# Patient Record
Sex: FEMALE | Race: WHITE | NOT HISPANIC OR LATINO | Employment: FULL TIME | URBAN - METROPOLITAN AREA
[De-identification: names, ages, dates, MRNs, and addresses within clinical notes are randomized per-mention and may not be internally consistent; named-entity substitution may affect disease eponyms.]

---
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
## 2021-03-16 ENCOUNTER — HOSPITAL ENCOUNTER (OUTPATIENT)
Dept: FAMILY MEDICINE CLINIC | Facility: CLINIC | Age: 21
Discharge: HOME | End: 2021-03-16
Attending: FAMILY MEDICINE

## 2021-03-16 ENCOUNTER — CONVERSION ENCOUNTER (OUTPATIENT)
Dept: FAMILY MEDICINE CLINIC | Facility: CLINIC | Age: 21
End: 2021-03-16

## 2021-03-16 ENCOUNTER — OFFICE VISIT CONVERTED (OUTPATIENT)
Dept: FAMILY MEDICINE CLINIC | Facility: CLINIC | Age: 21
End: 2021-03-16
Attending: FAMILY MEDICINE

## 2021-03-16 LAB
ALBUMIN SERPL-MCNC: 4.4 G/DL (ref 3.5–5)
ALBUMIN/GLOB SERPL: 1.8 {RATIO} (ref 1.4–2.6)
ALP SERPL-CCNC: 66 U/L (ref 50–130)
ALT SERPL-CCNC: 16 U/L (ref 10–40)
ANION GAP SERPL CALC-SCNC: 15 MMOL/L (ref 8–19)
AST SERPL-CCNC: 21 U/L (ref 15–50)
BILIRUB SERPL-MCNC: 0.81 MG/DL (ref 0.2–1.3)
BILIRUB UR QL STRIP: NEGATIVE
BUN SERPL-MCNC: 10 MG/DL (ref 5–25)
BUN/CREAT SERPL: 13 {RATIO} (ref 6–20)
CALCIUM SERPL-MCNC: 9.4 MG/DL (ref 8.7–10.4)
CHLORIDE SERPL-SCNC: 104 MMOL/L (ref 99–111)
COLOR UR: YELLOW
CONV CLARITY OF URINE: NORMAL
CONV CO2: 23 MMOL/L (ref 22–32)
CONV PROTEIN IN URINE BY AUTOMATED TEST STRIP: NEGATIVE
CONV TOTAL PROTEIN: 6.9 G/DL (ref 6.3–8.2)
CONV UROBILINOGEN IN URINE BY AUTOMATED TEST STRIP: NORMAL
CREAT UR-MCNC: 0.75 MG/DL (ref 0.5–0.9)
GFR SERPLBLD BASED ON 1.73 SQ M-ARVRAT: >60 ML/MIN/{1.73_M2}
GLOBULIN UR ELPH-MCNC: 2.5 G/DL (ref 2–3.5)
GLUCOSE SERPL-MCNC: 79 MG/DL (ref 65–99)
GLUCOSE UR QL: NEGATIVE
HGB UR QL STRIP: NORMAL
KETONES UR QL STRIP: NEGATIVE
LEUKOCYTE ESTERASE UR QL STRIP: NEGATIVE
NITRITE UR QL STRIP: NEGATIVE
OSMOLALITY SERPL CALC.SUM OF ELEC: 284 MOSM/KG (ref 273–304)
PH UR STRIP.AUTO: 6.5 [PH]
POTASSIUM SERPL-SCNC: 4.1 MMOL/L (ref 3.5–5.3)
SODIUM SERPL-SCNC: 138 MMOL/L (ref 135–147)
SP GR UR: NORMAL

## 2021-03-18 LAB — BACTERIA UR CULT: NORMAL

## 2021-03-23 ENCOUNTER — HOSPITAL ENCOUNTER (OUTPATIENT)
Dept: CT IMAGING | Facility: HOSPITAL | Age: 21
Discharge: HOME | End: 2021-03-23
Attending: FAMILY MEDICINE

## 2021-03-23 ENCOUNTER — HOSPITAL ENCOUNTER (OUTPATIENT)
Dept: FAMILY MEDICINE CLINIC | Facility: CLINIC | Age: 21
Discharge: HOME | End: 2021-03-23
Attending: FAMILY MEDICINE

## 2021-03-25 LAB — BACTERIA UR CULT: NORMAL

## 2021-03-26 ENCOUNTER — HOSPITAL ENCOUNTER (OUTPATIENT)
Dept: URGENT CARE | Facility: CLINIC | Age: 21
Discharge: HOME | End: 2021-03-26
Attending: NURSE PRACTITIONER

## 2021-03-26 LAB
CONV HIV-1/ HIV-2: NONREACTIVE
RPR SER QL: NORMAL

## 2021-03-29 LAB
C TRACH RRNA CVX QL NAA+PROBE: NEGATIVE
CONV HEPATITIS B SURFACE AG W CONFIRMATION RE: NEGATIVE
HAV IGM SERPL QL IA: NEGATIVE
HBV CORE IGM SERPL QL IA: NEGATIVE
HCV AB SER DONR QL: <0.1 S/CO RATIO (ref 0–0.9)
N GONORRHOEA DNA SPEC QL NAA+PROBE: NEGATIVE

## 2021-04-07 ENCOUNTER — CONVERSION ENCOUNTER (OUTPATIENT)
Dept: OTOLARYNGOLOGY | Facility: CLINIC | Age: 21
End: 2021-04-07

## 2021-04-07 ENCOUNTER — OFFICE VISIT CONVERTED (OUTPATIENT)
Dept: OTOLARYNGOLOGY | Facility: CLINIC | Age: 21
End: 2021-04-07
Attending: OTOLARYNGOLOGY

## 2021-04-16 ENCOUNTER — HOSPITAL ENCOUNTER (OUTPATIENT)
Dept: ULTRASOUND IMAGING | Facility: HOSPITAL | Age: 21
Discharge: HOME | End: 2021-04-16
Attending: OTOLARYNGOLOGY

## 2021-04-22 ENCOUNTER — TELEPHONE CONVERTED (OUTPATIENT)
Dept: OTOLARYNGOLOGY | Facility: CLINIC | Age: 21
End: 2021-04-22
Attending: OTOLARYNGOLOGY

## 2021-05-13 NOTE — PROGRESS NOTES
Progress Note      Patient Name: Fátima Cortes   Patient ID: [id number]   Sex: Female   YOB: 2000    Primary Care Provider: Daily Marley MD   Referring Provider: Daily Marley MD    Visit Date: 2020    Provider: LEILA Jovel   Location: [facility]   Location Address: [address] [place] [address], [address]  [place], [place]  [id number]   Location Phone: [phone]          Chief Complaint  · BODYACHES, FEVER, CHILLS      History Of Present Illness  Fátima Cortes is a 20 year old /White female who presents for evaluation and treatment of:      Presents today for an acute visit for body aches chills fever nausea and vomiting.  She states Monday she began feeling nauseous and started vomiting Tuesday she felt better but Wednesday she says she began having with fever chills and body aches.  She works at [employer].  She denies being around anybody ill.       Past Medical History  Back pain; ETD (Eustachian tube dysfunction), bilateral; Lymphadenopathy of head and neck; Recurrent tonsillitis; Viral pharyngitis         Past Surgical History  Ear Tubes; Thyroid surgery         Medication List  levothyroxine 125 mcg oral tablet; promethazine 12.5 mg oral tablet         Allergy List  NO KNOWN DRUG ALLERGIES         Family Medical History  Lung Neoplasm, Malignant; Thyroid Gland Neoplasm, Malignant         Reproductive History   0 Para 0 0 0 0       Social History  Tobacco (Never)         Immunizations  Name Date Admin   Meningococcal (MNG)    Meningococcal (MNG)    Meningococcal (MNG)          Review of Systems  · Constitutional  o Admits  o : fatigue, fever, chills, body aches  o Denies  o : headache  · Eyes  o Denies  o : blurred vision, changes in vision  · HENT  o Admits  o : lightheadedness, nasal congestion, nasal discharge  o Denies  o : headaches, vertigo, sinus pain, postnasal drip, sore throat, ear pain, ear  "fullness  · Cardiovascular  o Denies  o : lower extremity edema, claudication, chest pressure, palpitations  · Respiratory  o Denies  o : shortness of breath, wheezing, cough  · Gastrointestinal  o Admits  o : nausea, vomiting  o Denies  o : diarrhea, constipation, abdominal pain, blood in stools, melena  · Genitourinary  o Denies  o : frequency, dysuria, hematuria      Vitals  Date Time BP Position Site L\R Cuff Size HR RR TEMP (F) WT  HT  BMI kg/m2 BSA m2 O2 Sat        09/09/2020 09:41 /76 Sitting    94 - R  97.5 106lbs 0oz 5'  2\" 19.39 1.45 99 %          Physical Examination  · Constitutional  o Appearance  o : alert, in no acute distress  · Head and Face  o Head  o :   § Inspection  § : atraumatic, normocephalic  o Face  o :   § Inspection  § : no facial lesions  o HEENT  o : Unremarkable  · Eyes  o Conjunctivae  o : conjunctivae normal  o Sclerae  o : sclerae white  o Pupils and Irises  o : pupils equal and round, pupils reactive to light bilaterally  o Eyelids/Ocular Adnexae  o : eyelid appearance normal  · Ears, Nose, Mouth and Throat  o Ears  o :   § External Ears  § : appearance within normal limits, no lesions present  § Otoscopic Examination  § : tympanic membrane appearance within normal limits bilaterally  o Nose  o :   § External Nose  § : appearance normal  o Oral Cavity  o :   § Oral Mucosa  § : oral mucosa normal  § Lips  § : lip appearance normal  § Teeth  § : normal dentition for age  § Gums  § : gums pink, non-swollen, no bleeding present  § Tongue  § : tongue appearance normal  § Palate  § : hard palate normal, soft palate appearance normal  o Throat  o : no erythema, tonsils +1, no exudate  · Neck  o Inspection/Palpation  o : normal appearance, no masses or tenderness, trachea midline  o Thyroid  o : gland size normal, nontender, no nodules or masses present on palpation  · Respiratory  o Respiratory Effort  o : breathing unlabored  o Auscultation of Lungs  o : normal breath " sounds  · Cardiovascular  o Heart  o :   § Auscultation of Heart  § : regular rate, normal rhythm, no murmurs present  o Peripheral Vascular System  o :   § Extremities  § : no edema  · Gastrointestinal  o Abdominal Examination  o : abdomen nontender to palpation, normal bowel sounds, tone normal without rigidity or guarding, no masses present  o Liver and spleen  o : no hepatomegaly present  · Lymphatic  o Neck  o : no lymphadenopathy   o Supraclavicular Nodes  o : no supraclavicular nodes          Results  · In-Office Procedures  o Lab procedure  § IOP - Influenza A/B Test (12766)   § Influenza A: Negative   § Influenza B: Negative   § Internal Control Verified?: Yes       Assessment  · Nausea and vomiting     787.01/R11.2  Meclizine 12.5 mg every 6 hours as needed for nausea and vomiting  · Flu-like symptoms     780.99/R68.89  Flu was negative. Will test for COVID-19. Acetaminophen 650 mg was given to patient in office. Will prescribe Tamiflu 75 mg twice daily    Problems Reconciled  Plan  · Orders  o ACO-39: Current medications updated and reviewed () - - 09/09/2020  o [facility] NCOV2 (send-out) (63140) - 787.01/R11.2, 780.99/R68.89 - 09/09/2020  · Medications  o Tamiflu 75 mg oral capsule   SIG: take 1 capsule (75 mg) by oral route 2 times per day for 5 days   DISP: (10) capsules with 0 refills  Prescribed on 09/09/2020     o promethazine 12.5 mg oral tablet   SIG: take 1 tablet (12.5 mg) by oral route every 6 hours as needed for nausea and vomiting   DISP: (10) tablets with 0 refills  Refilled on 09/09/2020     o Augmentin 875-125 mg oral tablet   SIG: take 1 tablet by oral route every 12 hours for 7 days   DISP: (14) tablet with 0 refills  Discontinued on 09/09/2020     o Medications have been Reconciled  o Transition of Care or Provider Policy  · Instructions  o Patient was educated/instructed on their diagnosis, treatment and medications prior to discharge from the clinic today.  o Patient  instructed to seek medical attention urgently for new or worsening symptoms.  o Call the office with any concerns or questions.  · Disposition  o Call or Return if symptoms worsen or persist.            Electronically Signed by: Arnie Jaime APRN -Author on September 9, 2020 10:57:34 AM

## 2021-05-13 NOTE — PROGRESS NOTES
Progress Note      Patient Name: Fátima Cortes   Patient ID: [id number]   Sex: Female   YOB: 2000    Primary Care Provider: Daily Marley MD   Referring Provider: Daily Marley MD    Visit Date: September 17, 2020    Provider: Daily Marley MD   [place] [facility]   [place] Address: [address] [place] [address], [address] [place] [address]  [id number]   Location Phone: [phone]          Chief Complaint  · Follow up office visit within 7 calendar days of discharge from inpatient status (high complexity).      History Of Present Illness  Fátima Cortes is a 20 year old /White female who presents for evaluation and treatment of:   FOLLOW UP OFFICE VISIT WITHIN 7 CALENDAR DAYS OF INPATIENT STATUS (SEVERE COMPLEXITY)  Fátima Cortes presents to office for follow up post discharge from inpatient status within 7 calendar days. Patient was contacted within 2 business days via phone conversation. Documentation of that phone contact is present in the patient's electronic chart. Patient was admitted to an inpatient faciliity on 09/10/2020 and discharged on 09/12/2020 due to: Pyelonephritis   Admitting MD: Jessica   Her discharge summary has been reviewed and placed in the patient's electronic chart.   Patient's problem list is: ETD (Eustachian tube dysfunction), bilateral, Lymphadenopathy of head and neck, and Recurrent tonsillitis   Patient's outpatient medication list has been reconciled with the medication list from the discharge summary and has been reviewed with the patient. Current medication list is: ibuprofen 200 mg oral tablet, Levaquin 750 mg oral tablet, levothyroxine 125 mcg oral tablet, Tylenol Extra Strength 500 mg oral tablet, and Vienva 0.1-20 mg-mcg oral tablet      Patient reports that she does feel much better than when she recently had the infection.  Patient is sexually active so we discussed that she needs to go to the restroom and urinate after intercourse  also increase her fluids so that she can help flush her kidneys.  Patient has moderate antibiotics left.  Patient also needs to be taking prenatal vitamins even though she is on birth control.  Patient reports that she is currently not in school but is think about retaking classes in the spring.    Scoliosispatient reports that she has had chronic back pain for the last 2 years.  She has not had a scoliosis survey done of her back in approximately 2 to 3 years.       Past Medical History  Back pain; ETD (Eustachian tube dysfunction), bilateral; Lymphadenopathy of head and neck; Recurrent tonsillitis; Viral pharyngitis         Past Surgical History  Ear Tubes; Thyroid surgery         Medication List  ibuprofen 200 mg oral tablet; Levaquin 750 mg oral tablet; levothyroxine 125 mcg oral tablet; Tylenol Extra Strength 500 mg oral tablet; Vienva 0.1-20 mg-mcg oral tablet         Allergy List  NO KNOWN DRUG ALLERGIES         Family Medical History  Lung Neoplasm, Malignant; Thyroid Gland Neoplasm, Malignant         Reproductive History   0 Para 0 0 0 0       Social History  Tobacco (Never)         Immunizations  Name Date Admin   Meningococcal (MN) 2019   Meningococcal (MN) 2019   Meningococcal (MNG) 2019         Review of Systems  · Constitutional  o Denies  o : fatigue, fever, weight loss, weight gain  · Eyes  o Denies  o : eye discomfort, eye pain  · HENT  o Denies  o : vertigo, nasal congestion  · Cardiovascular  o Denies  o : chest pain, irregular heart beats  · Respiratory  o Denies  o : shortness of breath, wheezing  · Gastrointestinal  o Denies  o : nausea, vomiting  · Genitourinary  o Denies  o : frequency, dysuria  · Integument  o Denies  o : rash, itching  · Neurologic  o Denies  o : muscular weakness, memory difficulties  · Musculoskeletal  o Admits  o : back pain  o Denies  o : joint swelling, muscle pain  · Psychiatric  o Denies  o : anxiety, depression  · Heme-Lymph  o Denies  o :  "lightheadedness, easy bleeding  · Allergic-Immunologic  o Denies  o : sinus allergy symptoms, allergic dermatitis      Vitals  Date Time BP Position Site L\R Cuff Size HR RR TEMP (F) WT  HT  BMI kg/m2 BSA m2 O2 Sat FR L/min FiO2        09/17/2020 08:11 /78 Sitting    77 - R 14 97.6 109lbs 6oz 5'  2\" 20 1.47 99 %            Physical Examination  · Constitutional  o Appearance  o : well-nourished, in no acute distress  · Eyes  o Conjunctivae  o : conjunctivae normal  o Sclerae  o : sclerae white  o Pupils and Irises  o : pupils equal, round, and reactive to light and accommodation bilaterally  o Eyelids/Ocular Adnexae  o : eyelid appearance normal, no exudates present  · Ears, Nose, Mouth and Throat  o Ears  o :   § External Ears  § : external auditory canal appearance within normal limits, no discharge present  § Otoscopic Examination  § : tympanic membrane appearance within normal limits bilaterally  o Nose  o :   § External Nose  § : appearance normal  § Nasopharynx  § : no discharge present  o Oral Cavity  o :   § Oral Mucosa  § : oral mucosa normal  § Lips  § : lip appearance normal  o Throat  o :   § Oropharynx  § : no inflammation or lesions present, tonsils within normal limits  · Neck  o Inspection/Palpation  o : normal appearance, no masses or tenderness, trachea midline  o Range of Motion  o : cervical range of motion within normal limits  o Thyroid  o : gland size normal, nontender, no nodules or masses present on palpation  o Jugular Veins  o : JVP normal  · Respiratory  o Respiratory Effort  o : breathing unlabored  o Inspection of Chest  o : normal appearance  o Auscultation of Lungs  o : normal breath sounds throughout  · Cardiovascular  o Heart  o :   § Auscultation of Heart  § : regular rate and rhythm, no murmurs, gallops or rubs  o Peripheral Vascular System  o :   § Extremities  § : no edema  · Gastrointestinal  o Abdominal Examination  o : abdomen nontender to palpation, tone normal " without rigidity or guarding, no masses present, bowel sounds present in all four quadrants  o Liver and spleen  o : no hepatomegaly present, liver nontender to palpation, spleen not palpable  o Hernias  o : no hernias present  · Lymphatic  o Neck  o : no lymphadenopathy present  · Musculoskeletal  o Spine  o :   § Inspection/Palpation  § : paraspinal muscle tenderness present   § Stability  § : no subluxations present  § Range of Motion  § : spine range of motion normal  o Right Upper Extremity  o :   § Inspection/Palpation  § : no tenderness to palpation, ROM normal  o Left Upper Extremity  o :   § Inspection/Palpation  § : no tenderness to palpation, ROM normal  o Right Lower Extremity  o :   § Inspection/Palpation  § : no joint or limb tenderness to palpation, ROM normal  o Left Lower Extremity  o :   § Inspection/Palpation  § : no joint or limb tenderness to palpation, ROM normal  · Skin and Subcutaneous Tissue  o General Inspection  o : no rashes or lesions present, no areas of discoloration  o Body Hair  o : scalp palpation normal, hair normal for age, general body hair distribution normal for age  o Digits and Nails  o : no clubbing, cyanosis, deformities or edema present, normal appearing nails  · Neurologic  o Mental Status Examination  o :   § Orientation  § : grossly oriented to person, place and time  o Gait and Station  o : normal gait, able to stand without difficulty  · Psychiatric  o Judgement and Insight  o : judgment and insight intact  o Mood and Affect  o : mood normal, affect appropriate              Assessment  · Lumbago     724.2/M54.5  · Screening for depression     V79.0/Z13.89  · Scoliosis     737.30/M41.9  · Pyelonephritis     590.80/N12      Plan  · Orders  o MRI lumbar spine w/o contrast (39749) - 724.2/M54.5 - 09/17/2020  o ACO-39: Current medications updated and reviewed () - - 09/17/2020  o ACO-18: Negative screen for clinical depression using a standardized tool () - -  09/17/2020  o ACO-14: Influenza immunization was not administered for reasons documented () - - 09/17/2020   decliend  o Discharge medications reconciled with the current medication list (1111F) - - 09/17/2020  o Scoliosis Survey (76157) - 737.30/M41.9 - 09/17/2020  · Medications  o Medications have been Reconciled  o Transition of Care or Provider Policy  · Instructions  o Depression Screen completed and scanned into the EMR under the designated folder within the patient's documents.  o Patient was educated/instructed on their diagnosis, treatment and medications prior to discharge from the clinic today.  o Minutes spent with patient including greater than 50% in Education/Counseling/Care Coordination.  o Time spent with the patient was minutes, more than 50% face to face. 25 minutes  o Patient discharge summary has been reviewed and placed in patient's electronic medical record.  o Patient received a phone call from my office within 2 business days of discharge from inpatient status, and documented within the patient's chart.  o Also patient was seen (face to face) for follow up evaluation within 7 calendar days of discharge from inpatient status for a high complexity issue.  o Patient was educated on their diagnosis, treatment and any medication changes while being evaluated today.  · Disposition  o f/u 2 months            Electronically Signed by: Daily Marley MD -Author on October 26, 2020 06:52:04 AM

## 2021-05-14 VITALS
SYSTOLIC BLOOD PRESSURE: 104 MMHG | BODY MASS INDEX: 19.92 KG/M2 | HEIGHT: 62 IN | DIASTOLIC BLOOD PRESSURE: 66 MMHG | HEART RATE: 70 BPM | TEMPERATURE: 98 F | WEIGHT: 108.25 LBS | OXYGEN SATURATION: 100 %

## 2021-05-14 VITALS
TEMPERATURE: 98.2 F | HEIGHT: 62 IN | HEART RATE: 56 BPM | WEIGHT: 114.37 LBS | DIASTOLIC BLOOD PRESSURE: 52 MMHG | BODY MASS INDEX: 21.05 KG/M2 | OXYGEN SATURATION: 98 % | SYSTOLIC BLOOD PRESSURE: 100 MMHG

## 2021-05-14 VITALS — WEIGHT: 116.25 LBS | TEMPERATURE: 97.4 F | BODY MASS INDEX: 21.39 KG/M2 | HEIGHT: 62 IN

## 2021-05-14 VITALS
HEART RATE: 94 BPM | TEMPERATURE: 97.5 F | WEIGHT: 106 LBS | DIASTOLIC BLOOD PRESSURE: 76 MMHG | BODY MASS INDEX: 19.51 KG/M2 | SYSTOLIC BLOOD PRESSURE: 112 MMHG | HEIGHT: 62 IN | OXYGEN SATURATION: 99 %

## 2021-05-14 VITALS
RESPIRATION RATE: 14 BRPM | HEART RATE: 77 BPM | SYSTOLIC BLOOD PRESSURE: 110 MMHG | DIASTOLIC BLOOD PRESSURE: 78 MMHG | TEMPERATURE: 97.6 F | OXYGEN SATURATION: 99 % | HEIGHT: 62 IN | WEIGHT: 109.37 LBS | BODY MASS INDEX: 20.13 KG/M2

## 2021-05-14 NOTE — PROGRESS NOTES
"   Progress Note      Patient Name: Fátima Cortes   Patient ID: [id number]   Sex: Female   YOB: 2000    Primary Care Provider: Daily Marley MD   Referring Provider: Daily Marley MD    Visit Date: December 29, 2020    Provider: LEILA Merino   Location: [facility]   Location Address: [address] [place] [address], [address] [place] [address]  [id number]   Location Phone: [phone]          Chief Complaint  · possible UTI  · right flank pain      History Of Present Illness  Fátima Cortes is a 20 year old /White female who presents for evaluation and treatment of:      She is here for an acute visit today, she is a patient of Dr. Sutton.    She is complaining of possible bladder infection.  She states in September she was in the hospital due to pyelonephritis.  She is complaining of right flank pain again with some bladder pressure for the past 2 days.  She denies any fever chills or nausea and vomiting. She was tested for STDs while in the hospital, always negative.    UA trace blood, negative WBCs, negative nitrites.  Urine pregnancy test is negative.       Past Medical History  Disease Name Date Onset Notes   Back pain --  --    ETD (Eustachian tube dysfunction), bilateral --  --    Lymphadenopathy of head and neck --  --    Recurrent tonsillitis --  --    Viral pharyngitis --  --          Past Surgical History  Procedure Name Date Notes   Ear Tubes 2002 --    Thyroid surgery 2017 \"Done in [place]\"         Medication List  Name Date Started Instructions   ibuprofen 200 mg oral tablet  take 2 tablets (400 mg) by oral route every 4 hours as needed with food   levothyroxine 125 mcg oral tablet  take 1 tablet (125 mcg) by oral route once daily   Tylenol Extra Strength 500 mg oral tablet  take 2 tablets (1,000 mg) by oral route every 4 hours as needed not to exceed 8 tablets per 24hrs   Vienva 0.1-20 mg-mcg oral tablet  take 1 tablet by oral route once daily " "        Allergy List  Allergen Name Date Reaction Notes   NO KNOWN DRUG ALLERGIES --  --  --          Family Medical History  Disease Name Relative/Age Notes   Lung Neoplasm, Malignant  Paternal grandparent   Thyroid Gland Neoplasm, Malignant Aunt/   --          Reproductive History  Menstrual   Last Menstrual Period: 10/05/2015   Pregnancy Summary   Total Pregnancies: 0 Full Term: 0 Premature: 0   Ab Induced: 0 Ab Spontaneous: 0 Ectopics: 0   Multiples: 0 Livin         Social History  Finding Status Start/Stop Quantity Notes   Tobacco Never --/-- --  --          Immunizations  NameDate Admin Mfg Trade Name Lot Number Route Inj VIS Given VIS Publication   InfluenzaRefused 2020 NE Not Entered  NE NE     Comments:    Meningococcal (MNG)2019 SKB MENACTRA [id number] IM LD 2019    Comments: Tolerated well, stable         Review of Systems  · Constitutional  o Denies  o : fatigue, fever, chills, body aches, night sweats  · Cardiovascular  o Denies  o : lower extremity edema, claudication, chest pressure, palpitations  · Respiratory  o Denies  o : shortness of breath, wheezing, cough, hemoptysis, dyspnea on exertion  · Gastrointestinal  o Denies  o : nausea, vomiting, diarrhea, constipation, abdominal pain  · Genitourinary  o Denies  o : urgency, frequency, dysuria  · Integument  o Denies  o : rash, itching  · Musculoskeletal  o Admits  o : back pain  o Denies  o : limitation of motion      Vitals  Date Time BP Position Site L\R Cuff Size HR RR TEMP (F) WT  HT  BMI kg/m2 BSA m2 O2 Sat FR L/min FiO2        2020 10:48 /66 Sitting    70 - R  98 108lbs 4oz 5'  2\" 19.8 1.47 100 %            Physical Examination  · Constitutional  o Appearance  o : no acute distress, well-nourished  · Head and Face  o Head  o :   § Inspection  § : atraumatic, normocephalic  · Respiratory  o Respiratory Effort  o : breathing comfortably, symmetric chest rise  o Auscultation of Lungs  o : clear to asculatation " bilaterally, no wheezes, rales, or rhonchii  · Cardiovascular  o Heart  o :   § Auscultation of Heart  § : regular rate and rhythm, no murmurs, rubs, or gallops  o Peripheral Vascular System  o :   § Extremities  § : no edema  · Musculoskeletal  o Spine  o :   § Inspection/Palpation  § : no spinal tenderness or misalignment, no CVA tenderness noted  § Muscle Strength/Tone  § : right paraspinal muscle spasm present   · Neurologic  o Mental Status Examination  o :   § Orientation  § : grossly oriented to person, place and time  o Gait and Station  o :   § Gait Screening  § : normal gait  · Psychiatric  o General  o : normal mood and affect          Results  · In-Office Procedures  o Lab procedure  § IOP - Urinalysis without Microscopy (Clinitek) [facility] (88488)   § Color Ur: Yellow   § Clarity Ur: Clear   § Glucose Ur Ql Strip: Negative   § Bilirub Ur Ql Strip: Negative   § Ketones Ur Ql Strip: Negative   § Sp Gr Ur Qn: 1.025   § Hgb Ur Ql Strip: Trace-Intact   § pH Ur-LsCnc: 5.5   § Prot Ur Ql Strip: Negative   § Urobilinogen Ur Strip-mCnc: 0.2 E.U./dL   § Nitrite Ur Ql Strip: Negative   § WBC Est Ur Ql Strip: Negative   § IOP - Urine Pregnancy Test (44373)   § B-HCG Ur Ql: Negative   § Internal Control Verified?: Yes       Assessment  · Pelvic pressure in female     625.8/R10.2  · Hematuria     599.70/R31.9  · History of pyelonephritis     V13.02/Z87.448  · Right flank pain     789.09/R10.9  Due to her history of pyelonephritis, will get a KUB, will send urine for culture and sensitivity. I will go ahead and start her on Cipro 500 mg twice daily. Increase fluid intake. If you develop fever, chills, N/V, flank pain, or worsening of your symptoms return to the office or go to the ER.      Plan  · Orders  o Urinalysis with Reflex Microscopy ([facility]) (42454) - 625.8/R10.2, 599.70/R31.9, V13.02/Z87.448, 789.09/R10.9 - 12/29/2020  o Urine culture (09909, 46873) - 625.8/R10.2, 599.70/R31.9, V13.02/Z87.448, 789.09/R10.9 -  12/29/2020  o ACO-39: Current medications updated and reviewed (, 1159F) - - 12/29/2020  o ACO-14: Influenza immunization was not administered for reasons documented [facility] () - - 12/29/2020   patient declines  o KUB xray [facility] (30114) - 625.8/R10.2, 599.70/R31.9, V13.02/Z87.448, 789.09/R10.9 - 12/29/2020  · Medications  o Cipro 500 mg oral tablet   SIG: take 1 tablet (500 mg) by oral route every 12 hours for 7 days   DISP: (14) Tablet with 0 refills  Prescribed on 12/29/2020     o Medications have been Reconciled  o Transition of Care or Provider Policy  · Instructions  o Rest. Increase Fluids.  o Patient was educated/instructed on their diagnosis, treatment and medications prior to discharge from the clinic today.  o Patient instructed to seek medical attention urgently for new or worsening symptoms.  o Call the office with any concerns or questions.  o Increase fluid intake. If you develop fever, chills, N/V, flank pain, or worsening of your symptoms return to the office or go to the ER.  · Disposition  o Call or Return if symptoms worsen or persist.            Electronically Signed by: LEILA Merino -Author on December 29, 2020 11:22:09 AM

## 2021-05-14 NOTE — PROGRESS NOTES
"   Progress Note      Patient Name: Fátima Cortes   Patient ID: [id number]   Sex: Female   YOB: 2000    Primary Care Provider: Daily Marley MD   Referring Provider: Daily Marley MD    Visit Date: April 7, 2021    Provider: Dhaval Diallo MD   Location: [facility] [place] [place] [facility]   Location Address: [address] [place] [address], [address] [place] [address]  [id number]   Location Phone: [phone]          Chief Complaint     \"I noticed a lump a few weeks ago.\"       History Of Present Illness     Fátima Cortes is a 21 year old /White female with past medical history significant for total thyroidectomy secondary to thyroid nodules who returns today for evaluation of the neck mass. She was originally seen on 5/8/18 at which time she had initially noticed a left-sided neck mass at the beginning of February. She had suffered from strep throat a month before but was not sick at the time she discovered the mass. She ended up taking a 10 day course of amoxicillin starting on 3/27/18 without improvement. They had decreased in size and were never painful. She has undergone extensive workup between [facility] and [facility] including negative CMV DNA, negative Bartonella henselae and quinana on 4/17/18. She also was noted to have increased EBV IgG but not IgM. She also underwent an ultrasound that [facility] on 4/13/18 which revealed a left 2.1 x 0.9 x 2 cm lymph node. She was seen by Dr. Rivera where she underwent a hematologic evaluation for leukemia and lymphoma which was negative. On examination that day there were bilateral level II lymph nodes which were approximately 1 cm diameter rubbery to palpation. Options for management were discussed and she decided to continue observation and a repeat ultrasound was scheduled for 6 weeks. Repeat ultrasound of the neck on 8/8/18 revealed 2 abnormal-appearing hypo-echoic nodes in the left neck measuring 1.6 x 1.2 x 0.6 cm and 1.1 x 1.1 x 0.4 cm. ultrasound-guided " FNA this was performed on 18 and was negative for malignant cells.  There was noted to be a polymorphous lymphoid population most consistent with benign reactive lymph node.      She returns today for evaluation of the neck mass.  She tells me that she noticed it a few weeks ago.  It has not changed in size and is not causing her any pain.  She denies any sore throat, dysphagia, otalgia, hoarseness, neck stiffness, fever, chills, night sweats, or unintentional weight loss.  CT scan of the neck with contrast on 3/23/2021 revealed 2 separate enhancing masses in the area of the previous thyroid on the left with the largest measuring up to 1.6 cm.  She does have a previous history of total thyroidectomy she believes at [facility].  She tells me that they did not find any cancer in her thyroid but there is a family history of thyroid cancer.  He has not undergone any further imaging or tissue sampling.  She previously underwent an ultrasound of the head and neck on 2018 but I cannot review those images due to a system error.  At that time there were 2 hypoechoic masses in the left neck measuring 1.6 and 1.1 cm.              Past Medical History  Back pain; ETD (Eustachian tube dysfunction), bilateral; Lymphadenopathy of head and neck; Recurrent tonsillitis; Viral pharyngitis         Past Surgical History  Ear Tubes; Thyroid surgery         Medication List  ibuprofen 200 mg oral tablet; levothyroxine 125 mcg oral tablet         Allergy List  NO KNOWN DRUG ALLERGIES         Family Medical History  Lung Neoplasm, Malignant; Thyroid Gland Neoplasm, Malignant         Reproductive History   0 Para 0 0 0 0       Social History  Tobacco (Never)         Immunizations  Name Date Admin   Influenza Refused   Meningococcal (MNG) 2019   Meningococcal (MNG) 2019   Meningococcal (MNG) 2019         Review of Systems  · Constitutional  o Denies  o : fever, night sweats, weight  "loss  · Eyes  o Denies  o : discharge from eye, impaired vision  · HENT  o Admits  o : *See HPI  · Cardiovascular  o Denies  o : chest pain, irregular heart beats  · Respiratory  o Denies  o : shortness of breath, wheezing, coughing up blood  · Gastrointestinal  o Denies  o : heartburn, reflux, vomiting blood  · Genitourinary  o Denies  o : frequency  · Integument  o Denies  o : rash, skin dryness  · Neurologic  o Denies  o : seizures, loss of balance, loss of consciousness, dizziness  · Endocrine  o Denies  o : cold intolerance, heat intolerance  · Heme-Lymph  o Denies  o : easy bleeding, anemia      Vitals  Date Time BP Position Site L\R Cuff Size HR RR TEMP (F) WT  HT  BMI kg/m2 BSA m2 O2 Sat FR L/min FiO2 HC       04/07/2021 09:02 AM        97.4 116lbs 4oz 5'  2\" 21.26 1.52             Physical Examination  · Constitutional  o Appearance  o : well developed, well-nourished, alert and in no acute distress, voice clear and strong  · Head and Face  o Head  o :   § Inspection  § : no deformities or lesions  o Face  o :   § Inspection  § : No facial lesions; House-Brackmann I/VI bilaterally  § Palpation  § : No TMJ crepitus nor  muscle tenderness bilaterally  · Eyes  o Vision  o :   § Visual Fields  § : Extraocular movements are intact. No spontaneous or gaze-induced nystagmus.  o Conjunctivae  o : clear  o Sclerae  o : clear  o Pupils and Irises  o : pupils equal, round, and reactive to light.   · Ears, Nose, Mouth and Throat  o Ears  o :   § External Ears  § : appearance within normal limits, no lesions present  § Otoscopic Examination  § : tympanic membrane appearance within normal limits bilaterally without perforations, well-aerated middle ears  § Hearing  § : intact to conversational voice both ears  o Nose  o :   § External Nose  § : appearance normal  § Intranasal Exam  § : mucosa within normal limits, vestibules normal, no intranasal lesions present, septum midline, sinuses non tender to " percussion  o Oral Cavity  o :   § Oral Mucosa  § : oral mucosa normal without pallor or cyanosis  § Lips  § : lip appearance normal  § Teeth  § : normal dentition for age  § Gums  § : gums pink, non-swollen, no bleeding present  § Tongue  § : tongue appearance normal; normal mobility  § Palate  § : hard palate normal, soft palate appearance normal with symmetric mobility  o Throat  o :   § Oropharynx  § : no inflammation or lesions present, tonsils within normal limits  § Hypopharynx  § : Deferred secondary to gag reflex  § Larynx  § : Deferred secondary to gag reflex  · Neck  o Inspection/Palpation  o : normal appearance, no masses or tenderness, trachea midline; thyroid surgically absent with a faint scar.  · Respiratory  o Respiratory Effort  o : breathing unlabored  o Inspection of Chest  o : normal appearance, no retractions  · Cardiovascular  o Heart  o : regular rate and rhythm  · Lymphatic  o Neck  o : Approximately 1.5 cm soft nontender mass overlying the left thyroid cartilage which is mobile  o Supraclavicular Nodes  o : no lymphadenopathy present  o Preauricular Nodes  o : no lymphadenopathy present  · Skin and Subcutaneous Tissue  o General Inspection  o : Regarding face and neck - there are no rashes present, no lesions present, and no areas of discoloration  · Neurologic  o Cranial Nerves  o : cranial nerves II-XII are grossly intact bilaterally  o Gait and Station  o : normal gait, able to stand without diffculty  · Psychiatric  o Judgement and Insight  o : judgment and insight intact  o Mood and Affect  o : mood normal, affect appropriate          Assessment  · Neck mass     784.2/R22.1      Plan  · Orders  o FNA Neck Mass w/ guidance (66247) - 784.2/R22.1 - 04/07/2021  · Medications  o Medications have been Reconciled  o Transition of Care or Provider Policy  · Instructions  o Impressions and findings were discussed with Ms. Cortes at great length. Currently, she is seen for evaluation of a  left-sided neck mass which she initially noticed around 3 or 4 weeks ago. She does have a previous history of total thyroidectomy for noncancerous disease. She was actually previously seen for left-sided lymphadenopathy which an FNA on 9/12/2018 noted a polymorphous lymphoid population most likely consistent with benign reactive lymph node. She has undergone a previous ultrasound of that area but those images are not available to me today. However, her most recent CT scan of the neck with contrast from 3/23/2021 revealed 2 separate enhancing masses centered on the thyroid cartilage on the left with one measuring up to 1.6 cm and the other up to 1.4 cm. We discussed that these appear consistent with residual thyroid tissue and I am curious if this may represent the previously noted neck masses although the palpable nodule today seems to be in a different place than previously. Options for further evaluation and management were discussed including continued observation versus ultrasound-guided FNA. After a thorough discussion she has elected to pursue an ultrasound-guided FNA. We will discuss the results over the phone.  · Correspondence  o ENT Letter to Referring MD (Daily Malrey MD) - 04/07/2021            Electronically Signed by: Dhaval Diallo MD -Author on April 7, 2021 09:36:54 AM

## 2021-05-14 NOTE — PROGRESS NOTES
"   Progress Note      Patient Name: Fátima Cortes   Patient ID: [id number]   Sex: Female   YOB: 2000    Primary Care Provider: Daily Marley MD   Referring Provider: Daily Marley MD    Visit Date: 2021    Provider: Daily Marley MD   Location: [facility]   Location Address: [address] [place] [address], [address]  [id number]   Location Phone: [phone]          Chief Complaint  · \"Lump in the neck\"      History Of Present Illness  Fátima Cortes is a 20 year old /White female who presents for evaluation and treatment of:      Neck masspatient has a left-sided anterior pea-sized mass in her leg.  Nontender to palpation.  Patient has had a thyroidectomy so it should not be related to any thyroid tissue.  Patient noticed this a few days ago.  Patient has a family history significant for multiple family members with thyroid cancer.  Will be getting a CT scan soft tissue neck to evaluate.    History of recurrent urinary tract infectionspatient had recurrent urinary tract infections for approximately 3 months.  Her last urine culture grew out E. coli in December and she has not had a repeat 1 for testicular since.  I will be ordering a in office urine dip.       Past Medical History  Back pain; ETD (Eustachian tube dysfunction), bilateral; Lymphadenopathy of head and neck; Recurrent tonsillitis; Viral pharyngitis         Past Surgical History  Ear Tubes; Thyroid surgery         Medication List  ibuprofen 200 mg oral tablet; levothyroxine 125 mcg oral tablet         Allergy List  NO KNOWN DRUG ALLERGIES       Allergies Reconciled  Family Medical History  Lung Neoplasm, Malignant; Thyroid Gland Neoplasm, Malignant         Reproductive History   0 Para 0 0 0 0       Social History  Tobacco (Never)         Immunizations  Name Date Admin   Influenza Refused   Meningococcal (MNG) 2019   Meningococcal (MNG) 2019   Meningococcal (MNG) " "11/04/2019         Review of Systems  · Constitutional  o Denies  o : fatigue, fever, weight loss, weight gain  · Eyes  o Denies  o : eye discomfort, eye pain  · HENT  o Admits  o : neck mass  o Denies  o : vertigo, nasal congestion  · Cardiovascular  o Denies  o : chest pain, irregular heart beats  · Respiratory  o Denies  o : shortness of breath, wheezing  · Gastrointestinal  o Denies  o : nausea, vomiting  · Genitourinary  o Denies  o : frequency, dysuria  · Integument  o Denies  o : rash, itching  · Neurologic  o Denies  o : muscular weakness, memory difficulties  · Musculoskeletal  o Denies  o : joint swelling, muscle pain  · Psychiatric  o Denies  o : anxiety, depression  · Heme-Lymph  o Denies  o : lightheadedness, easy bleeding  · Allergic-Immunologic  o Denies  o : sinus allergy symptoms, allergic dermatitis      Vitals  Date Time BP Position Site L\R Cuff Size HR RR TEMP (F) WT  HT  BMI kg/m2 BSA m2 O2 Sat FR L/min FiO2        03/16/2021 08:49 /52 Sitting    56 - R  98.2 114lbs 6oz 5'  2\" 20.92 1.51 98 %            Physical Examination  · Constitutional  o Appearance  o : well-nourished, in no acute distress  · Eyes  o Conjunctivae  o : conjunctivae normal  o Sclerae  o : sclerae white  o Pupils and Irises  o : pupils equal, round, and reactive to light and accommodation bilaterally  o Eyelids/Ocular Adnexae  o : eyelid appearance normal, no exudates present  · Ears, Nose, Mouth and Throat  o Ears  o :   § External Ears  § : external auditory canal appearance within normal limits, no discharge present  § Otoscopic Examination  § : tympanic membrane appearance within normal limits bilaterally  o Nose  o :   § External Nose  § : appearance normal  § Nasopharynx  § : no discharge present  o Oral Cavity  o :   § Oral Mucosa  § : oral mucosa normal  § Lips  § : lip appearance normal  o Throat  o :   § Oropharynx  § : no inflammation or lesions present, tonsils within normal " limits  · Neck  o Inspection/Palpation  o : mass present   o Range of Motion  o : cervical range of motion within normal limits  o Jugular Veins  o : JVP normal  · Respiratory  o Respiratory Effort  o : breathing unlabored  o Inspection of Chest  o : normal appearance  o Auscultation of Lungs  o : normal breath sounds throughout  · Cardiovascular  o Heart  o :   § Auscultation of Heart  § : regular rate and rhythm, no murmurs, gallops or rubs  o Peripheral Vascular System  o :   § Extremities  § : no edema  · Gastrointestinal  o Abdominal Examination  o : abdomen nontender to palpation, tone normal without rigidity or guarding, no masses present, bowel sounds present in all four quadrants  o Liver and spleen  o : no hepatomegaly present, liver nontender to palpation, spleen not palpable  o Hernias  o : no hernias present  · Lymphatic  o Neck  o : no lymphadenopathy present  · Musculoskeletal  o Spine  o :   § Inspection/Palpation  § : no spinal tenderness, scoliosis or kyphosis present  § Stability  § : no subluxations present  § Range of Motion  § : spine range of motion normal  o Right Upper Extremity  o :   § Inspection/Palpation  § : no tenderness to palpation, ROM normal  o Left Upper Extremity  o :   § Inspection/Palpation  § : no tenderness to palpation, ROM normal  o Right Lower Extremity  o :   § Inspection/Palpation  § : no joint or limb tenderness to palpation, ROM normal  o Left Lower Extremity  o :   § Inspection/Palpation  § : no joint or limb tenderness to palpation, ROM normal  · Skin and Subcutaneous Tissue  o General Inspection  o : no rashes or lesions present, no areas of discoloration  o Body Hair  o : scalp palpation normal, hair normal for age, general body hair distribution normal for age  o Digits and Nails  o : no clubbing, cyanosis, deformities or edema present, normal appearing nails  · Neurologic  o Mental Status Examination  o :   § Orientation  § : grossly oriented to person, place and  time  o Gait and Station  o : normal gait, able to stand without difficulty  · Psychiatric  o Judgement and Insight  o : judgment and insight intact  o Mood and Affect  o : mood normal, affect appropriate          Results  · In-Office Procedures  o Lab procedure  § IOP - Urinalysis without Microscopy (Clinitek) [facility] ([id number])   § Color Ur: Yellow   § Clarity Ur: Slightly cloudy   § Glucose Ur Ql Strip: Negative   § Bilirub Ur Ql Strip: Negative   § Ketones Ur Ql Strip: Negative   § Sp Gr Ur Qn: greater than 1.030   § Hgb Ur Ql Strip: Trace-Intact   § pH Ur-LsCnc: 6.5   § Prot Ur Ql Strip: Negative   § Urobilinogen Ur Strip-mCnc: 0.2 E.U./dL   § Nitrite Ur Ql Strip: Negative   § WBC Est Ur Ql Strip: Negative       Assessment  · Neck mass     784.2/R22.1  · Family history of thyroid cancer     V16.8/Z80.8  · Recurrent UTI     599.0/N39.0  · Hematuria     599.70/R31.9      Plan  · Orders  o CMP [facility] ([id number]) - 784.2/R22.1 - 03/16/2021  o Urine culture (31244, 04848) - 599.0/N39.0, 599.70/R31.9 - 03/16/2021  o ACO-14: Influenza immunization was not administered for reasons documented [facility] () - - 03/16/2021   patient declined  o ACO-39: Current medications updated and reviewed (1159F, ) - - 03/16/2021  o CT Neck Soft Tissue (Not Cervical Spine) with IV Contrast [facility] ([id number]) - 784.2/R22.1, V16.8/Z80.8 - 03/16/2021  · Medications  o Medications have been Reconciled  o Transition of Care or Provider Policy  · Instructions  o Patient was educated/instructed on their diagnosis, treatment and medications prior to discharge from the clinic today.  o Minutes spent with patient including greater than 50% in Education/Counseling/Care Coordination.  o Time spent with the patient was minutes, more than 50% face to face. 25 minutes  · Disposition  o Call or Return if symptoms worsen or persist.            Electronically Signed by: Daily Marley MD -Author on March 16, 2021 10:12:01 AM

## 2021-05-14 NOTE — PROGRESS NOTES
Progress Note      Patient Name: Fátima Cortes   Patient ID: [id number]   Sex: Female   YOB: 2000    Primary Care Provider: Daily Marley MD   Referring Provider: Daily Marley MD    Visit Date: April 22, 2021    Provider: Dhaval Diallo MD   Location: [facility]   Location Address: [address] [place] [address]  [id number]          History Of Present Illness  TELEHEALTH TELEPHONE VISIT  Fátima Cortes is a 21 year old /White female who is presenting for evaluation via telehealth telephone visit. Verbal consent obtained before beginning visit. She was most recently seen on 4/7/2021 after a CT scan of the neck with contrast on 3/23/2021 revealed 2 separate enhancing masses centered on the thyroid cartilage on the left measuring 1.6 and 1.4 cm. This appeared consistent with residual thyroid tissue and after a thorough discussion including continued observation versus FNA she elected to pursue an FNA. However, Dr. Hendricks felt that a thyroid uptake scan would be more helpful to conclude that this is in fact thyroid tissue. We discussed this over the phone today as well as the alternative with obtaining an ultrasound of the neck in September she has previously had evidence concerning for these masses previously. After a thorough discussion of options for further evaluation she has elected to pursue an ultrasound which will be scheduled for September 2021.   Provider spent 7 minutes with the patient during the telehealth visit.   The following staff were present during this visit: Dhaval Diallo MD   Past Medical History/ Overview of Patient Symptoms          Assessment  · Neck mass     784.2/R22.1      Plan  · Orders  o Physican Telephone evaluation, 5-10 min ([id number]) - 784.2/R22.1 - 04/22/2021  o Neck US (32040) - 784.2/R22.1 - 09/22/2021  · Instructions  o Plan Of Care: She will follow-up after her neck ultrasound or sooner if  needed.            Electronically Signed by: Dhaval Diallo MD -Author on April 22, 2021 12:28:38 PM

## 2021-05-15 VITALS
OXYGEN SATURATION: 99 % | HEIGHT: 62 IN | DIASTOLIC BLOOD PRESSURE: 54 MMHG | HEART RATE: 62 BPM | WEIGHT: 108.37 LBS | TEMPERATURE: 98.3 F | BODY MASS INDEX: 19.94 KG/M2 | SYSTOLIC BLOOD PRESSURE: 106 MMHG

## 2021-05-15 VITALS
WEIGHT: 101.12 LBS | DIASTOLIC BLOOD PRESSURE: 64 MMHG | BODY MASS INDEX: 17.92 KG/M2 | SYSTOLIC BLOOD PRESSURE: 102 MMHG | OXYGEN SATURATION: 100 % | HEART RATE: 67 BPM | HEIGHT: 63 IN | TEMPERATURE: 97.9 F

## 2021-05-15 VITALS
BODY MASS INDEX: 19.57 KG/M2 | DIASTOLIC BLOOD PRESSURE: 63 MMHG | WEIGHT: 106.37 LBS | TEMPERATURE: 98.9 F | RESPIRATION RATE: 16 BRPM | SYSTOLIC BLOOD PRESSURE: 111 MMHG | HEART RATE: 80 BPM | HEIGHT: 62 IN | OXYGEN SATURATION: 100 %

## 2021-05-15 VITALS
WEIGHT: 106.12 LBS | HEIGHT: 62 IN | OXYGEN SATURATION: 99 % | TEMPERATURE: 98.5 F | HEART RATE: 90 BPM | SYSTOLIC BLOOD PRESSURE: 118 MMHG | DIASTOLIC BLOOD PRESSURE: 62 MMHG | BODY MASS INDEX: 19.53 KG/M2

## 2021-05-15 VITALS
TEMPERATURE: 98.2 F | DIASTOLIC BLOOD PRESSURE: 68 MMHG | SYSTOLIC BLOOD PRESSURE: 112 MMHG | BODY MASS INDEX: 19.51 KG/M2 | HEART RATE: 78 BPM | OXYGEN SATURATION: 99 % | WEIGHT: 106 LBS | HEIGHT: 62 IN

## 2021-05-16 VITALS
HEIGHT: 62 IN | TEMPERATURE: 98.3 F | WEIGHT: 115 LBS | RESPIRATION RATE: 16 BRPM | SYSTOLIC BLOOD PRESSURE: 112 MMHG | BODY MASS INDEX: 21.16 KG/M2 | DIASTOLIC BLOOD PRESSURE: 65 MMHG | HEART RATE: 55 BPM | OXYGEN SATURATION: 99 %

## 2021-05-16 VITALS
HEIGHT: 63 IN | HEART RATE: 76 BPM | RESPIRATION RATE: 16 BRPM | WEIGHT: 117.5 LBS | SYSTOLIC BLOOD PRESSURE: 118 MMHG | OXYGEN SATURATION: 100 % | BODY MASS INDEX: 20.82 KG/M2 | DIASTOLIC BLOOD PRESSURE: 64 MMHG | TEMPERATURE: 98.2 F

## 2021-05-16 VITALS
OXYGEN SATURATION: 99 % | BODY MASS INDEX: 20.38 KG/M2 | TEMPERATURE: 98.6 F | HEIGHT: 63 IN | DIASTOLIC BLOOD PRESSURE: 78 MMHG | HEART RATE: 99 BPM | RESPIRATION RATE: 16 BRPM | WEIGHT: 115 LBS | SYSTOLIC BLOOD PRESSURE: 110 MMHG

## 2021-05-16 VITALS
DIASTOLIC BLOOD PRESSURE: 60 MMHG | HEART RATE: 69 BPM | BODY MASS INDEX: 20.2 KG/M2 | WEIGHT: 114 LBS | TEMPERATURE: 98.5 F | HEIGHT: 63 IN | RESPIRATION RATE: 12 BRPM | SYSTOLIC BLOOD PRESSURE: 122 MMHG | OXYGEN SATURATION: 99 %

## 2021-05-16 VITALS
SYSTOLIC BLOOD PRESSURE: 109 MMHG | HEART RATE: 66 BPM | BODY MASS INDEX: 21.74 KG/M2 | DIASTOLIC BLOOD PRESSURE: 59 MMHG | WEIGHT: 118.12 LBS | RESPIRATION RATE: 15 BRPM | TEMPERATURE: 98.5 F | OXYGEN SATURATION: 100 % | HEIGHT: 62 IN

## 2021-05-16 VITALS
OXYGEN SATURATION: 98 % | DIASTOLIC BLOOD PRESSURE: 71 MMHG | RESPIRATION RATE: 15 BRPM | SYSTOLIC BLOOD PRESSURE: 124 MMHG | TEMPERATURE: 99.1 F | WEIGHT: 116 LBS | HEART RATE: 96 BPM | HEIGHT: 62 IN | BODY MASS INDEX: 21.35 KG/M2

## 2021-05-22 ENCOUNTER — TRANSCRIBE ORDERS (OUTPATIENT)
Dept: ADMINISTRATIVE | Facility: HOSPITAL | Age: 21
End: 2021-05-22

## 2021-05-22 DIAGNOSIS — R22.1 NECK MASS: Primary | ICD-10-CM

## 2021-08-24 ENCOUNTER — OFFICE VISIT (OUTPATIENT)
Dept: FAMILY MEDICINE CLINIC | Facility: CLINIC | Age: 21
End: 2021-08-24

## 2021-08-24 VITALS
OXYGEN SATURATION: 100 % | DIASTOLIC BLOOD PRESSURE: 68 MMHG | BODY MASS INDEX: 20.98 KG/M2 | HEART RATE: 75 BPM | HEIGHT: 62 IN | WEIGHT: 114 LBS | TEMPERATURE: 98.3 F | SYSTOLIC BLOOD PRESSURE: 104 MMHG | RESPIRATION RATE: 12 BRPM

## 2021-08-24 DIAGNOSIS — J01.00 ACUTE MAXILLARY SINUSITIS, RECURRENCE NOT SPECIFIED: Primary | ICD-10-CM

## 2021-08-24 PROCEDURE — 99213 OFFICE O/P EST LOW 20 MIN: CPT | Performed by: FAMILY MEDICINE

## 2021-08-24 PROCEDURE — U0003 INFECTIOUS AGENT DETECTION BY NUCLEIC ACID (DNA OR RNA); SEVERE ACUTE RESPIRATORY SYNDROME CORONAVIRUS 2 (SARS-COV-2) (CORONAVIRUS DISEASE [COVID-19]), AMPLIFIED PROBE TECHNIQUE, MAKING USE OF HIGH THROUGHPUT TECHNOLOGIES AS DESCRIBED BY CMS-2020-01-R: HCPCS | Performed by: FAMILY MEDICINE

## 2021-08-24 RX ORDER — LEVOTHYROXINE SODIUM 0.15 MG/1
TABLET ORAL
COMMUNITY
End: 2021-08-24

## 2021-08-24 RX ORDER — LEVOTHYROXINE SODIUM 0.12 MG/1
TABLET ORAL
COMMUNITY
Start: 2021-07-14 | End: 2022-05-10 | Stop reason: SDUPTHER

## 2021-08-24 RX ORDER — IBUPROFEN 200 MG
TABLET ORAL
COMMUNITY
End: 2021-08-24

## 2021-08-24 NOTE — PROGRESS NOTES
Chief Complaint  Chief Complaint   Patient presents with   • Nasal Congestion     started today , was exposed to covid by friend on sat .       HPI:  Fátima Cortes presents to [facility]     Pt reports her symptoms started today and has no fever or coughing but has sinus drainage.  No covid vaccine.  Pt reports she has a friend who was positive for covid who she was in close contact.     Medical History:  Past History:  Medical History: has a past medical history of Back pain, ETD (Eustachian tube dysfunction), bilateral, Lymphadenopathy of head and neck, Recurrent tonsillitis, and Viral pharyngitis.   Surgical History: has a past surgical history that includes Ear Tubes Removal (2002) and Thyroid surgery (2017).   Family History: family history includes Lung cancer in an other family member; Thyroid cancer in an other family member.   Social History: reports that she has never smoked. She has never used smokeless tobacco. She reports that she does not drink alcohol and does not use drugs.  Immunization History   Administered Date(s) Administered   • Flu Vaccine Intradermal Quad 18-64YR 02/04/2015   • HPV Quadrivalent 02/04/2015, 03/17/2015   • Hep A, 2 Dose 08/27/2014, 03/17/2015   • Meningococcal B,(Bexsero) 11/04/2019   • Meningococcal MCV4P (Menactra) 08/27/2014   • Tdap 08/27/2014   • Varicella 08/27/2014         Allergies: Patient has no known allergies.     Medications:  Current Outpatient Medications on File Prior to Visit   Medication Sig Dispense Refill   • levothyroxine (SYNTHROID, LEVOTHROID) 125 MCG tablet TAKE 1 TABLET BY MOUTH EVERY DAY IN THE MORNING ON AN EMPTY STOMACH     • [DISCONTINUED] ibuprofen (ADVIL,MOTRIN) 200 MG tablet ibuprofen 200 mg oral tablet take 2 tablets (400 mg) by oral route every 4 hours as needed with food   Active     • [DISCONTINUED] levothyroxine (SYNTHROID, LEVOTHROID) 150 MCG tablet levothyroxine 150 mcg oral tablet take 1 tablet (150  "mcg) by oral route once daily   Suspended       No current facility-administered medications on file prior to visit.        Health Maintenance Due   Topic Date Due   • ANNUAL PHYSICAL  Never done   • HPV VACCINES (3 - 2-dose series) 08/04/2015       Vital Signs:   Vitals:    08/24/21 1422   BP: 104/68   Pulse: 75   Resp: 12   Temp: 98.3 °F (36.8 °C)   SpO2: 100%   Weight: 51.7 kg (114 lb)   Height: 157.5 cm (62\")      Body mass index is 20.85 kg/m².     ROS:  Review of Systems   Constitutional: Negative for fatigue and fever.   HENT: Positive for congestion and rhinorrhea. Negative for ear pain and sinus pressure.    Respiratory: Negative for cough, chest tightness and shortness of breath.    Cardiovascular: Negative for chest pain, palpitations and leg swelling.   Gastrointestinal: Negative for abdominal pain and diarrhea.   Genitourinary: Negative for dysuria and frequency.   Neurological: Negative for speech difficulty, headache and confusion.   Psychiatric/Behavioral: Negative for agitation and behavioral problems.          Physical Exam  Constitutional:       Appearance: Normal appearance.   HENT:      Right Ear: Tympanic membrane normal.      Left Ear: Tympanic membrane normal.      Nose: Nose normal.   Eyes:      Extraocular Movements: Extraocular movements intact.      Conjunctiva/sclera: Conjunctivae normal.      Pupils: Pupils are equal, round, and reactive to light.   Cardiovascular:      Rate and Rhythm: Normal rate and regular rhythm.   Pulmonary:      Effort: Pulmonary effort is normal.      Breath sounds: Normal breath sounds.   Abdominal:      General: Bowel sounds are normal.   Musculoskeletal:         General: Normal range of motion.      Cervical back: Normal range of motion.   Skin:     General: Skin is warm and dry.   Neurological:      General: No focal deficit present.      Mental Status: She is alert and oriented to person, place, and time.   Psychiatric:         Mood and Affect: Mood normal. "         Behavior: Behavior normal.               Diagnoses and all orders for this visit:    1. Acute maxillary sinusitis, recurrence not specified (Primary)  -     COVID-19,[facility], NP SWAB IN TRANSPORT MEDIA 48-72 HR TAT - Swab, Nasopharynx          Smoking Cessation:    Fátima Cortes  reports that she has never smoked. She has never used smokeless tobacco.          Follow Up   Return if symptoms worsen or fail to improve.  Patient was given instructions and counseling regarding her condition or for health maintenance advice. Please see specific information pulled into the AVS if appropriate.       Daily Marley MD

## 2021-08-25 ENCOUNTER — TELEPHONE (OUTPATIENT)
Dept: FAMILY MEDICINE CLINIC | Facility: CLINIC | Age: 21
End: 2021-08-25

## 2021-08-25 NOTE — TELEPHONE ENCOUNTER
Caller: Fátima Cortes    Relationship: Self    Best call back number: [phone]     Caller requesting test results: SELF    What test was performed: COVID     When was the test performed: 8/24    CALLER REQUESTING TEST RESULTS WHEN THEY ARE IN.

## 2021-08-25 NOTE — TELEPHONE ENCOUNTER
Called and informed patient that her Covid results were not back yet.  Informed patient once we received the results we would give her a call.  Patient verbally stated understanding

## 2021-08-27 LAB — SARS-COV-2 RNA RESP QL NAA+PROBE: NOT DETECTED

## 2021-09-21 ENCOUNTER — TELEPHONE (OUTPATIENT)
Dept: FAMILY MEDICINE CLINIC | Facility: CLINIC | Age: 21
End: 2021-09-21

## 2021-09-21 NOTE — TELEPHONE ENCOUNTER
Caller: Fátima Cortes A    Relationship to patient: Self    Best call back number: [phone]    Patient is needing: PATIENT CALLED IN AND WOULD LIKE AN APPOINTMENT TODAY. SHE IS HAVING BACK PAIN AND DARK URINE. SHE SAID SHE HAD A KIDNEY INFECTION LAST YEAR AND THINKS SHE HAS ONE NOW. PLEASE CALL PATIENT AND ADVISE.

## 2021-09-21 NOTE — TELEPHONE ENCOUNTER
Jono pt to let her know we only have 2 providers whom are booked today that maybe we can get her in tomorrow and she said she would go to urgent care

## 2021-09-24 ENCOUNTER — HOSPITAL ENCOUNTER (OUTPATIENT)
Dept: ULTRASOUND IMAGING | Facility: HOSPITAL | Age: 21
Discharge: HOME | End: 2021-09-24
Admitting: OTOLARYNGOLOGY

## 2021-09-24 DIAGNOSIS — R22.1 NECK MASS: ICD-10-CM

## 2021-09-24 PROCEDURE — 76536 US EXAM OF HEAD AND NECK: CPT

## 2021-09-27 RX ORDER — PENICILLIN V POTASSIUM 500 MG/1
TABLET ORAL
COMMUNITY
Start: 2021-09-17 | End: 2022-02-12

## 2021-09-27 RX ORDER — CHLORHEXIDINE GLUCONATE 0.12 MG/ML
RINSE ORAL
COMMUNITY
Start: 2021-09-17 | End: 2022-05-10

## 2021-09-29 ENCOUNTER — OFFICE VISIT (OUTPATIENT)
Dept: OTOLARYNGOLOGY | Facility: CLINIC | Age: 21
End: 2021-09-29

## 2021-09-29 VITALS — BODY MASS INDEX: 21.09 KG/M2 | TEMPERATURE: 98.4 F | WEIGHT: 114.6 LBS | HEIGHT: 62 IN

## 2021-09-29 DIAGNOSIS — R22.1 NECK MASS: Primary | ICD-10-CM

## 2021-09-29 PROCEDURE — 99212 OFFICE O/P EST SF 10 MIN: CPT | Performed by: OTOLARYNGOLOGY

## 2021-09-29 NOTE — PROGRESS NOTES
Patient Name: Fátima Cortes   Visit Date: 09/29/2021   Patient ID: [id number]  Provider: Dhaval Diallo MD    Sex: female  [place] [facility]   YOB: 2000  Location Address: [address] [place] [address], [address],?[place]?[address]    Primary Care Provider Daily Marley MD  Location Phone: [phone]    Referring Provider: No ref. provider found        Chief Complaint  Results (US)    History of Present Illness  Fátima Cortes is a 21 y.o. female with past medical history significant for total thyroidectomy secondary to thyroid nodules who returns today for evaluation. She was originally seen on 5/8/18 at which time she had initially noticed a left-sided neck mass at the beginning of February. She had suffered from strep throat a month before but was not sick at the time she discovered the mass. She ended up taking a 10 day course of amoxicillin starting on 3/27/18 without improvement. They had decreased in size and were never painful. She has undergone extensive workup between [facility] and [facility] including negative CMV DNA, negative Bartonella henselae and quinana on 4/17/18. She also was noted to have increased EBV IgG but not IgM. She also underwent an ultrasound that [facility] on 4/13/18 which revealed a left 2.1 x 0.9 x 2 cm lymph node. She was seen by Dr. Rivera where she underwent a hematologic evaluation for leukemia and lymphoma which was negative. On examination that day there were bilateral level II lymph nodes which were approximately 1 cm diameter rubbery to palpation. Options for management were discussed and she decided to continue observation and a repeat ultrasound was scheduled for 6 weeks. Repeat ultrasound of the neck on 8/8/18 revealed 2 abnormal-appearing hypo-echoic nodes in the left neck measuring 1.6 x 1.2 x 0.6 cm and 1.1 x 1.1 x 0.4 cm. ultrasound-guided FNA this was performed on 9/12/18 and was negative for malignant cells.  There was noted to be a  "polymorphous lymphoid population most consistent with benign reactive lymph node.  CT scan of the neck with contrast on 3/23/2021 revealed 2 separate enhancing masses in the area of the previous thyroid on the left with the largest measuring up to 1.6 cm and appeared consistent with residual thyroid tissue.  She does have a previous history of total thyroidectomy she believes at [facility].    She was set up to undergo an FNA but it was felt like a thyroid uptake scan would be more helpful.  We discussed things and she elected to pursue a follow-up ultrasound.    She returns today for follow-up.  She tells me that she has done well since her last appointment.  She has not had any issues with neck soreness, dysphagia, hoarseness, or neck stiffness.  Neck ultrasound on 9/24/2021 reveals a 1.5 x 1.9 x 0.4 cm left neck ovoid nodule.      Past Medical History:   Diagnosis Date   • Back pain    • ETD (Eustachian tube dysfunction), bilateral    • Lymphadenopathy of head and neck    • Recurrent tonsillitis    • Viral pharyngitis        Past Surgical History:   Procedure Laterality Date   • EAR TUBES  2002   • THYROID SURGERY  2017    \"Done in [place]\"         Current Outpatient Medications:   •  levothyroxine (SYNTHROID, LEVOTHROID) 125 MCG tablet, TAKE 1 TABLET BY MOUTH EVERY DAY IN THE MORNING ON AN EMPTY STOMACH, Disp: , Rfl:   •  chlorhexidine (PERIDEX) 0.12 % solution, RINSE AND GARGLE 15 ML BY MOUTH OR THROAT TWICE DAILY FOR 14 DAYS, Disp: , Rfl:   •  penicillin v potassium (VEETID) 500 MG tablet, TAKE 1 TABLET BY MOUTH FOUR TIMES DAILY FOR 7 DAYS, Disp: , Rfl:      No Known Allergies    Social History     Tobacco Use   • Smoking status: Never Smoker   • Smokeless tobacco: Never Used   Vaping Use   • Vaping Use: Never used   Substance Use Topics   • Alcohol use: Never   • Drug use: Never        Objective     Vital Signs:   Temp 98.4 °F (36.9 °C) (Temporal)   Ht 157.5 cm (62\")   Wt 52 kg (114 lb " 9.6 oz)   BMI 20.96 kg/m²       Physical Exam    General: Well developed, well nourished patient of stated age in no acute distress. Voice is strong and clear.   Head: Normocephalic and atraumatic.  Face: No lesions.  Bilateral parotid and submandibular glands are unremarkable.  Stensen's and Warthin's ducts are productive of clear saliva bilaterally.  House-Brackmann I/VI     bilaterally.   muscles and temporomandibular joint nontender to palpation.  No TMJ crepitus.  Eyes: PERRLA, sclerae anicteric, no conjunctival injection. Extra ocular movements are intact and full. No nystagmus.   Ears: Auricles are normal in appearance. Bilateral external auditory canals are unremarkable. Bilateral tympanic membranes are clear and without effusion. Hearing normal to conversational voice.   Nose: External nose is normal in appearance. Bilateral nares are patent with normal appearing mucosa. Septum midline. Turbinates are unremarkable. No lesions.   Oral Cavity: Lips are normal in appearance. Oral mucosa is unremarkable. Gingiva is unremarkable. Normal dentition for age. Tongue is unremarkable with good movement. Hard palate is unremarkable.   Oropharynx: Soft palate is unremarkable with full movement. Uvula is unremarkable. Bilateral tonsils are unremarkable. Posterior oropharynx is unremarkable.    Larynx and hypopharynx: Deferred secondary to gag reflex.  Neck: Supple.  Approximately 1.5 soft nontender mass overlying the left thyroid cartilage which is mobile. nontender to palpation.  Trachea midline. Thyroid surgically absent with a faint midline scar.   Lymphatic: No lymphadenopathy upon palpation.  Respiratory: Clear to auscultation bilaterally, nonlabored respirations    Cardiovascular: RRR, no murmurs, rubs, or gallops,   Psychiatric: Appropriate affect, cooperative   Neurologic: Oriented x 3, strength symmetric in all extremities, Cranial Nerves II-XII are grossly intact to confrontation   Skin: Warm and  dry. No rashes.    Procedures           Result Review :               Assessment and Plan    Diagnoses and all orders for this visit:    1. Neck mass (Primary)  -     US Head Neck Soft Tissue; Future      Impressions and findings were discussed.  We reviewed and discussed the images from her recent ultrasound which revealed a 1.9 cm ovoid structure in the area of concern with a small amount of vascular flow.  We discussed that this is likely consistent with residual thyroid tissue and has not changed from her previous CT scan.  I would recommend continued observation with a repeat ultrasound in 1 year or sooner if she feels like it is changing.    Follow Up   Return in about 1 year (around 9/29/2022).  Patient was given instructions and counseling regarding her condition or for health maintenance advice. Please see specific information pulled into the AVS if appropriate.

## 2021-10-15 ENCOUNTER — OFFICE VISIT (OUTPATIENT)
Dept: OBSTETRICS AND GYNECOLOGY | Facility: CLINIC | Age: 21
End: 2021-10-15

## 2021-10-15 VITALS
HEART RATE: 87 BPM | WEIGHT: 112 LBS | SYSTOLIC BLOOD PRESSURE: 105 MMHG | BODY MASS INDEX: 19.84 KG/M2 | DIASTOLIC BLOOD PRESSURE: 73 MMHG | HEIGHT: 63 IN

## 2021-10-15 DIAGNOSIS — Z01.419 ENCOUNTER FOR GYNECOLOGICAL EXAMINATION (GENERAL) (ROUTINE) WITHOUT ABNORMAL FINDINGS: Primary | ICD-10-CM

## 2021-10-15 DIAGNOSIS — Z30.011 ENCOUNTER FOR INITIAL PRESCRIPTION OF CONTRACEPTIVE PILLS: ICD-10-CM

## 2021-10-15 LAB
B-HCG UR QL: NEGATIVE
EXPIRATION DATE: NORMAL
INTERNAL NEGATIVE CONTROL: NEGATIVE
INTERNAL POSITIVE CONTROL: POSITIVE
Lab: NORMAL

## 2021-10-15 PROCEDURE — 99385 PREV VISIT NEW AGE 18-39: CPT | Performed by: OBSTETRICS & GYNECOLOGY

## 2021-10-15 PROCEDURE — 81025 URINE PREGNANCY TEST: CPT | Performed by: OBSTETRICS & GYNECOLOGY

## 2021-10-15 RX ORDER — LEVONORGESTREL AND ETHINYL ESTRADIOL 0.1-0.02MG
1 KIT ORAL DAILY
Qty: 84 TABLET | Refills: 3 | Status: SHIPPED | OUTPATIENT
Start: 2021-10-15 | End: 2022-08-15

## 2021-10-15 NOTE — PROGRESS NOTES
"Well Woman Visit    CC: Annual well woman exam       HPI:   21 y.o. Contraception or HRT: None  Menses:   q mon, lasts 5 days, changes products q 2hrs on heaviest days.   Pain:  Mild, OTC meds control discomfort  Incontinence concerns: No  Hx of abnormal pap:  No  Pt has no complaints today. wants to restart bcp.       History: PMHx, Meds, Allergies, PSHx, Social Hx, and POBHx all reviewed and updated.      PHYSICAL EXAM:  /73   Pulse 87   Ht 161 cm (63.39\")   Wt 50.8 kg (112 lb)   LMP 10/08/2021 (Approximate)   BMI 19.60 kg/m²   General- NAD, alert and oriented, appropriate  Psych- Normal mood, good memory  Neck- No masses, hx thyroidectomy  CV- Regular rhythm, no murnurs  Resp- CTA to bases, no wheezes  Abdomen- Soft, non distended, non tender, no masses  Ext- No edema, no cyanosis    Skin- No lesions, no rashes, no acanthosis nigricans        ASSESSMENT and PLAN:  WWE and Contraception    Diagnoses and all orders for this visit:    1. Encounter for gynecological examination (general) (routine) without abnormal findings (Primary)  -     POC Pregnancy, Urine    2. Encounter for initial prescription of contraceptive pills  -     levonorgestrel-ethinyl estradiol (AVIANE,ALESSE,LESSINA) 0.1-20 MG-MCG per tablet; Take 1 tablet by mouth Daily.  Dispense: 84 tablet; Refill: 3    discussed r/b/se of bcp.    Counseling:     OCP/Hormone use risk LANRE  Track menses, RTO IF <q21d, >7d long, or heavy    Domestic violence/abuse screen: negative    Depression screen: no SI    Preventative:   BREAST HEALTH- Monthly self breast exam importance and how to reviewed. MMG and/or MRI (prn) reviewed per society guidelines and her individual history. Mammo/MRI screen: Not medically needed.  CERVICAL CANCER Screening- Reviewed current ASCCP guidelines for screening w and wo cotest HPV, age specific.  Screen: Not medically needed.  COLON CANCER Screening- Reviewed current medical society guidelines and options.  " Colonoscopy screen:  Not medically needed.  SEXUAL HEALTH: Declines STD screening.  VACCINATIONS Recommended: Flu annually, Gardisil/HPV vaccine (up to 46yo).  Importance discussed, risk being unvaccinated reviewed.  Questions answered  Smoking status- NON SMOKER.  Importance of avoiding second hand smoke.  Myriad: Does not qualify.  Gardasil status: completed.      She understands the importance of having any ordered tests to be performed in a timely fashion.  She is encouraged to review her results online and/or contact or office if she has questions.     Follow Up:  Return if symptoms worsen or fail to improve.      Lizette Parham, APRN  10/15/2021

## 2022-02-07 PROBLEM — M54.9 BACK PAIN: Status: ACTIVE | Noted: 2022-02-07

## 2022-02-07 PROBLEM — E04.2 MULTIPLE THYROID NODULES: Status: ACTIVE | Noted: 2017-03-13

## 2022-02-07 PROBLEM — J03.91 RECURRENT TONSILLITIS: Status: ACTIVE | Noted: 2022-02-07

## 2022-02-07 PROBLEM — H69.93 ETD (EUSTACHIAN TUBE DYSFUNCTION), BILATERAL: Status: ACTIVE | Noted: 2022-02-07

## 2022-02-07 PROBLEM — H69.83 ETD (EUSTACHIAN TUBE DYSFUNCTION), BILATERAL: Status: ACTIVE | Noted: 2022-02-07

## 2022-02-07 PROBLEM — J02.9 VIRAL PHARYNGITIS: Status: ACTIVE | Noted: 2018-06-07

## 2022-02-07 PROBLEM — R59.1 LYMPHADENOPATHY OF HEAD AND NECK: Status: ACTIVE | Noted: 2018-06-07

## 2022-02-07 PROCEDURE — 87086 URINE CULTURE/COLONY COUNT: CPT | Performed by: PHYSICIAN ASSISTANT

## 2022-02-07 PROCEDURE — 87186 SC STD MICRODIL/AGAR DIL: CPT | Performed by: PHYSICIAN ASSISTANT

## 2022-02-07 PROCEDURE — 87077 CULTURE AEROBIC IDENTIFY: CPT | Performed by: PHYSICIAN ASSISTANT

## 2022-02-12 ENCOUNTER — HOSPITAL ENCOUNTER (EMERGENCY)
Facility: HOSPITAL | Age: 22
Discharge: HOME | End: 2022-02-12
Attending: EMERGENCY MEDICINE | Admitting: EMERGENCY MEDICINE

## 2022-02-12 VITALS
HEART RATE: 83 BPM | WEIGHT: 113.32 LBS | TEMPERATURE: 98 F | RESPIRATION RATE: 18 BRPM | BODY MASS INDEX: 20.85 KG/M2 | DIASTOLIC BLOOD PRESSURE: 71 MMHG | HEIGHT: 62 IN | SYSTOLIC BLOOD PRESSURE: 127 MMHG | OXYGEN SATURATION: 98 %

## 2022-02-12 DIAGNOSIS — N12 PYELONEPHRITIS: Primary | ICD-10-CM

## 2022-02-12 LAB
ALBUMIN SERPL-MCNC: 4.8 G/DL (ref 3.5–5.2)
ALBUMIN/GLOB SERPL: 1.7 G/DL
ALP SERPL-CCNC: 85 U/L (ref 39–117)
ALT SERPL W P-5'-P-CCNC: 14 U/L (ref 1–33)
ANION GAP SERPL CALCULATED.3IONS-SCNC: 12.5 MMOL/L (ref 5–15)
AST SERPL-CCNC: 18 U/L (ref 1–32)
BASOPHILS # BLD AUTO: 0.07 10*3/MM3 (ref 0–0.2)
BASOPHILS NFR BLD AUTO: 0.5 % (ref 0–1.5)
BILIRUB SERPL-MCNC: 0.7 MG/DL (ref 0–1.2)
BILIRUB UR QL STRIP: NEGATIVE
BUN SERPL-MCNC: 8 MG/DL (ref 6–20)
BUN/CREAT SERPL: 12.7 (ref 7–25)
CALCIUM SPEC-SCNC: 9.7 MG/DL (ref 8.6–10.5)
CHLORIDE SERPL-SCNC: 102 MMOL/L (ref 98–107)
CLARITY UR: CLEAR
CO2 SERPL-SCNC: 23.5 MMOL/L (ref 22–29)
COLOR UR: YELLOW
CREAT SERPL-MCNC: 0.63 MG/DL (ref 0.57–1)
DEPRECATED RDW RBC AUTO: 43.8 FL (ref 37–54)
EOSINOPHIL # BLD AUTO: 0.44 10*3/MM3 (ref 0–0.4)
EOSINOPHIL NFR BLD AUTO: 3.3 % (ref 0.3–6.2)
ERYTHROCYTE [DISTWIDTH] IN BLOOD BY AUTOMATED COUNT: 13.5 % (ref 12.3–15.4)
GFR SERPL CREATININE-BSD FRML MDRD: 120 ML/MIN/1.73
GLOBULIN UR ELPH-MCNC: 2.9 GM/DL
GLUCOSE SERPL-MCNC: 129 MG/DL (ref 65–99)
GLUCOSE UR STRIP-MCNC: NEGATIVE MG/DL
HCG INTACT+B SERPL-ACNC: <0.5 MIU/ML
HCT VFR BLD AUTO: 37.9 % (ref 34–46.6)
HGB BLD-MCNC: 12.5 G/DL (ref 12–15.9)
HGB UR QL STRIP.AUTO: NEGATIVE
HOLD SPECIMEN: NORMAL
HOLD SPECIMEN: NORMAL
IMM GRANULOCYTES # BLD AUTO: 0.05 10*3/MM3 (ref 0–0.05)
IMM GRANULOCYTES NFR BLD AUTO: 0.4 % (ref 0–0.5)
KETONES UR QL STRIP: ABNORMAL
LEUKOCYTE ESTERASE UR QL STRIP.AUTO: NEGATIVE
LIPASE SERPL-CCNC: 22 U/L (ref 13–60)
LYMPHOCYTES # BLD AUTO: 1.73 10*3/MM3 (ref 0.7–3.1)
LYMPHOCYTES NFR BLD AUTO: 13 % (ref 19.6–45.3)
MCH RBC QN AUTO: 29.2 PG (ref 26.6–33)
MCHC RBC AUTO-ENTMCNC: 33 G/DL (ref 31.5–35.7)
MCV RBC AUTO: 88.6 FL (ref 79–97)
MONOCYTES # BLD AUTO: 1.19 10*3/MM3 (ref 0.1–0.9)
MONOCYTES NFR BLD AUTO: 8.9 % (ref 5–12)
NEUTROPHILS NFR BLD AUTO: 73.9 % (ref 42.7–76)
NEUTROPHILS NFR BLD AUTO: 9.84 10*3/MM3 (ref 1.7–7)
NITRITE UR QL STRIP: NEGATIVE
NRBC BLD AUTO-RTO: 0 /100 WBC (ref 0–0.2)
PH UR STRIP.AUTO: 6.5 [PH] (ref 5–8)
PLATELET # BLD AUTO: 280 10*3/MM3 (ref 140–450)
PMV BLD AUTO: 11.2 FL (ref 6–12)
POTASSIUM SERPL-SCNC: 3.7 MMOL/L (ref 3.5–5.2)
PROT SERPL-MCNC: 7.7 G/DL (ref 6–8.5)
PROT UR QL STRIP: NEGATIVE
RBC # BLD AUTO: 4.28 10*6/MM3 (ref 3.77–5.28)
SODIUM SERPL-SCNC: 138 MMOL/L (ref 136–145)
SP GR UR STRIP: 1.02 (ref 1–1.03)
UROBILINOGEN UR QL STRIP: ABNORMAL
WBC NRBC COR # BLD: 13.32 10*3/MM3 (ref 3.4–10.8)
WHOLE BLOOD HOLD SPECIMEN: NORMAL
WHOLE BLOOD HOLD SPECIMEN: NORMAL

## 2022-02-12 PROCEDURE — 80053 COMPREHEN METABOLIC PANEL: CPT | Performed by: EMERGENCY MEDICINE

## 2022-02-12 PROCEDURE — 36415 COLL VENOUS BLD VENIPUNCTURE: CPT

## 2022-02-12 PROCEDURE — 85025 COMPLETE CBC W/AUTO DIFF WBC: CPT | Performed by: EMERGENCY MEDICINE

## 2022-02-12 PROCEDURE — 83690 ASSAY OF LIPASE: CPT | Performed by: EMERGENCY MEDICINE

## 2022-02-12 PROCEDURE — 99283 EMERGENCY DEPT VISIT LOW MDM: CPT

## 2022-02-12 PROCEDURE — 84702 CHORIONIC GONADOTROPIN TEST: CPT | Performed by: EMERGENCY MEDICINE

## 2022-02-12 PROCEDURE — 81003 URINALYSIS AUTO W/O SCOPE: CPT | Performed by: EMERGENCY MEDICINE

## 2022-02-12 RX ORDER — ONDANSETRON 4 MG/1
4 TABLET, ORALLY DISINTEGRATING ORAL EVERY 6 HOURS PRN
Qty: 10 TABLET | Refills: 0 | Status: SHIPPED | OUTPATIENT
Start: 2022-02-12 | End: 2022-05-10

## 2022-02-12 RX ORDER — LEVOFLOXACIN 750 MG/1
750 TABLET ORAL DAILY
Qty: 5 TABLET | Refills: 0 | Status: SHIPPED | OUTPATIENT
Start: 2022-02-12 | End: 2022-05-10

## 2022-02-12 RX ORDER — SODIUM CHLORIDE 0.9 % (FLUSH) 0.9 %
10 SYRINGE (ML) INJECTION AS NEEDED
Status: DISCONTINUED | OUTPATIENT
Start: 2022-02-12 | End: 2022-02-12 | Stop reason: HOSPADM

## 2022-02-12 NOTE — ED PROVIDER NOTES
"Time: 1:26 PM EST  Arrived by: private car  Chief Complaint: UTI  History provided by: patient  History is limited by: N/A     History of Present Illness:  Patient is a 21 y.o. year old female that presents to the emergency department with UTI. Patient states she was diagnosed with a UTI and prescribed Macrobid. She reports she has taken antibiotic over the past 4 days and symptoms have not improved. Patient called her PCP and they advised her to be seen in the ED.     She also complains of flank pain and body aches. She denies any dysuria, abdominal pain, nausea, vomiting or fever.     She reports flank pain and body aches is similar to symptoms she had with a previous kidney infection. Patient reports she returned to urgent care today and they prescribed Cefdinir but she has not filled this prescription.       Urinary Tract Infection  Pain severity:  Moderate  Progression:  Unchanged  Associated symptoms: flank pain    Associated symptoms: no abdominal pain, no fever, no nausea and no vomiting    Risk factors: hx of pyelonephritis          Patient Care Team  Primary Care Provider: Daily Marley MD    Past Medical History:     No Known Allergies  Past Medical History:   Diagnosis Date   • Back pain    • ETD (Eustachian tube dysfunction), bilateral    • Lymphadenopathy of head and neck    • Multiple thyroid nodules 3/13/2017   • Recurrent tonsillitis    • Viral pharyngitis      Past Surgical History:   Procedure Laterality Date   • EAR TUBES  2002   • THYROID SURGERY  2017    \"Done in [place]\"     Family History   Problem Relation Age of Onset   • Lung cancer Other    • Thyroid cancer Other        Home Medications:  Prior to Admission medications    Medication Sig Start Date End Date Taking? Authorizing Provider   cefdinir (OMNICEF) 300 MG capsule Take 1 capsule by mouth 2 (Two) Times a Day for 7 days. 2/12/22 2/19/22  Kiet Vargas PA-C   chlorhexidine (PERIDEX) 0.12 % solution RINSE AND GARGLE 15 ML " "BY MOUTH OR THROAT TWICE DAILY FOR 14 DAYS 9/17/21   Joanne Zhou MD   levonorgestrel-ethinyl estradiol (AVIANE,ALESSE,LESSINA) 0.1-20 MG-MCG per tablet Take 1 tablet by mouth Daily. 10/15/21 10/15/22  Lizette Parham APRN   levothyroxine (SYNTHROID, LEVOTHROID) 125 MCG tablet TAKE 1 TABLET BY MOUTH EVERY DAY IN THE MORNING ON AN EMPTY STOMACH 7/14/21   Joanne Zhou MD   nitrofurantoin, macrocrystal-monohydrate, (Macrobid) 100 MG capsule Take 1 capsule by mouth 2 (Two) Times a Day. 2/7/22 2/12/22  Kiet Vargas PA-C   penicillin v potassium (VEETID) 500 MG tablet TAKE 1 TABLET BY MOUTH FOUR TIMES DAILY FOR 7 DAYS 9/17/21 2/12/22  Joanne Zhou MD        Social History:   Social History     Tobacco Use   • Smoking status: Never Smoker   • Smokeless tobacco: Never Used   Vaping Use   • Vaping Use: Never used   Substance Use Topics   • Alcohol use: Never   • Drug use: Never       Review of Systems:  Review of Systems   Constitutional: Negative for chills and fever.   HENT: Negative for ear discharge.    Eyes: Negative for photophobia.   Respiratory: Negative for cough and shortness of breath.    Cardiovascular: Negative for chest pain.   Gastrointestinal: Negative for abdominal pain, diarrhea, nausea and vomiting.   Genitourinary: Positive for flank pain. Negative for dysuria.   Musculoskeletal: Negative for back pain and neck pain.   Skin: Negative for rash.   Neurological: Negative for headaches.        Physical Exam:  /71   Pulse 83   Temp 98 °F (36.7 °C)   Resp 18   Ht 157.5 cm (62\")   Wt 51.4 kg (113 lb 5.1 oz)   LMP 01/31/2022   SpO2 98%   BMI 20.73 kg/m²     Physical Exam  Vitals and nursing note reviewed.   Constitutional:       General: She is not in acute distress.  HENT:      Head: Normocephalic and atraumatic.   Cardiovascular:      Rate and Rhythm: Normal rate and regular rhythm.      Heart sounds: No murmur heard.      Pulmonary:      Effort: No respiratory " distress.      Breath sounds: Normal breath sounds.   Abdominal:      Palpations: Abdomen is soft.      Tenderness: There is no abdominal tenderness. There is left CVA tenderness.   Genitourinary:     Comments:  exam deferred.   Musculoskeletal:      Cervical back: Neck supple.   Skin:     General: Skin is warm and dry.      Findings: No rash.   Neurological:      General: No focal deficit present.      Mental Status: She is alert and oriented to person, place, and time.                Medications in the Emergency Department:  Medications - No data to display     Labs  Lab Results (last 24 hours)     ** No results found for the last 24 hours. **           Imaging:  No Radiology Exams Resulted Within Past 24 Hours    Procedures:  Procedures    Progress                            Medical Decision Making:  [facility]   Patient stable for discharge with antibiotic change.      Final diagnoses:   Pyelonephritis        Disposition:  ED Disposition     ED Disposition Condition Comment    Discharge Stable           Documentation assistance provided by Viktoria Villarreal acting as scribe for Emery Palacio DO. Information recorded by the scribe was done at my direction and has been verified and validated by me.          Viktoria Villarreal  02/12/22 1328       Emery Palacio DO  02/15/22 1230

## 2022-05-10 ENCOUNTER — OFFICE VISIT (OUTPATIENT)
Dept: FAMILY MEDICINE CLINIC | Facility: CLINIC | Age: 22
End: 2022-05-10

## 2022-05-10 VITALS
BODY MASS INDEX: 21.47 KG/M2 | OXYGEN SATURATION: 97 % | DIASTOLIC BLOOD PRESSURE: 66 MMHG | SYSTOLIC BLOOD PRESSURE: 110 MMHG | WEIGHT: 116.7 LBS | TEMPERATURE: 98.3 F | HEART RATE: 88 BPM | HEIGHT: 62 IN

## 2022-05-10 DIAGNOSIS — R73.09 ABNORMAL GLUCOSE: ICD-10-CM

## 2022-05-10 DIAGNOSIS — Z51.81 MEDICATION MONITORING ENCOUNTER: ICD-10-CM

## 2022-05-10 DIAGNOSIS — S16.1XXS STRAIN OF NECK MUSCLE, SEQUELA: ICD-10-CM

## 2022-05-10 DIAGNOSIS — E89.0 POSTOPERATIVE HYPOTHYROIDISM: Primary | ICD-10-CM

## 2022-05-10 LAB
ALBUMIN SERPL-MCNC: 4.8 G/DL (ref 3.5–5.2)
ALBUMIN/GLOB SERPL: 1.9 G/DL
ALP SERPL-CCNC: 58 U/L (ref 39–117)
ALT SERPL W P-5'-P-CCNC: 13 U/L (ref 1–33)
ANION GAP SERPL CALCULATED.3IONS-SCNC: 10.4 MMOL/L (ref 5–15)
AST SERPL-CCNC: 21 U/L (ref 1–32)
BASOPHILS # BLD AUTO: 0.06 10*3/MM3 (ref 0–0.2)
BASOPHILS NFR BLD AUTO: 0.8 % (ref 0–1.5)
BILIRUB SERPL-MCNC: 0.6 MG/DL (ref 0–1.2)
BUN SERPL-MCNC: 12 MG/DL (ref 6–20)
BUN/CREAT SERPL: 16.2 (ref 7–25)
CALCIUM SPEC-SCNC: 9.6 MG/DL (ref 8.6–10.5)
CHLORIDE SERPL-SCNC: 104 MMOL/L (ref 98–107)
CO2 SERPL-SCNC: 22.6 MMOL/L (ref 22–29)
CREAT SERPL-MCNC: 0.74 MG/DL (ref 0.57–1)
DEPRECATED RDW RBC AUTO: 41 FL (ref 37–54)
EGFRCR SERPLBLD CKD-EPI 2021: 118.2 ML/MIN/1.73
EOSINOPHIL # BLD AUTO: 0.14 10*3/MM3 (ref 0–0.4)
EOSINOPHIL NFR BLD AUTO: 2 % (ref 0.3–6.2)
ERYTHROCYTE [DISTWIDTH] IN BLOOD BY AUTOMATED COUNT: 12.4 % (ref 12.3–15.4)
GLOBULIN UR ELPH-MCNC: 2.5 GM/DL
GLUCOSE SERPL-MCNC: 74 MG/DL (ref 65–99)
HCT VFR BLD AUTO: 41 % (ref 34–46.6)
HGB BLD-MCNC: 13.1 G/DL (ref 12–15.9)
IMM GRANULOCYTES # BLD AUTO: 0.02 10*3/MM3 (ref 0–0.05)
IMM GRANULOCYTES NFR BLD AUTO: 0.3 % (ref 0–0.5)
LYMPHOCYTES # BLD AUTO: 2.32 10*3/MM3 (ref 0.7–3.1)
LYMPHOCYTES NFR BLD AUTO: 32.4 % (ref 19.6–45.3)
MCH RBC QN AUTO: 28.7 PG (ref 26.6–33)
MCHC RBC AUTO-ENTMCNC: 32 G/DL (ref 31.5–35.7)
MCV RBC AUTO: 89.7 FL (ref 79–97)
MONOCYTES # BLD AUTO: 0.41 10*3/MM3 (ref 0.1–0.9)
MONOCYTES NFR BLD AUTO: 5.7 % (ref 5–12)
NEUTROPHILS NFR BLD AUTO: 4.22 10*3/MM3 (ref 1.7–7)
NEUTROPHILS NFR BLD AUTO: 58.8 % (ref 42.7–76)
NRBC BLD AUTO-RTO: 0 /100 WBC (ref 0–0.2)
PLATELET # BLD AUTO: 330 10*3/MM3 (ref 140–450)
PMV BLD AUTO: 11.3 FL (ref 6–12)
POTASSIUM SERPL-SCNC: 4.1 MMOL/L (ref 3.5–5.2)
PROT SERPL-MCNC: 7.3 G/DL (ref 6–8.5)
RBC # BLD AUTO: 4.57 10*6/MM3 (ref 3.77–5.28)
SODIUM SERPL-SCNC: 137 MMOL/L (ref 136–145)
T4 FREE SERPL-MCNC: 1.28 NG/DL (ref 0.93–1.7)
TSH SERPL DL<=0.05 MIU/L-ACNC: 18.8 UIU/ML (ref 0.27–4.2)
WBC NRBC COR # BLD: 7.17 10*3/MM3 (ref 3.4–10.8)

## 2022-05-10 PROCEDURE — 80050 GENERAL HEALTH PANEL: CPT | Performed by: FAMILY MEDICINE

## 2022-05-10 PROCEDURE — 83036 HEMOGLOBIN GLYCOSYLATED A1C: CPT | Performed by: FAMILY MEDICINE

## 2022-05-10 PROCEDURE — 84439 ASSAY OF FREE THYROXINE: CPT | Performed by: FAMILY MEDICINE

## 2022-05-10 PROCEDURE — 99213 OFFICE O/P EST LOW 20 MIN: CPT | Performed by: FAMILY MEDICINE

## 2022-05-10 RX ORDER — LEVOTHYROXINE SODIUM 0.12 MG/1
125 TABLET ORAL
Qty: 90 TABLET | Refills: 1 | Status: SHIPPED | OUTPATIENT
Start: 2022-05-10 | End: 2022-05-11

## 2022-05-10 NOTE — PROGRESS NOTES
Chief Complaint  Hypothyroidism    Subjective          Fátima Cortes presents to [facility]  History of Present Illness  Patient presents today to discuss hypothyroidism.  Her primary care is Dr. Daily Marley.  She reports that she was seeing Dr. Davenport for hypothyroidism however her insurance is not covering these appointments anymore.  She reports taking 125 mcg of levothyroxine daily.  She reports compliance.  I discussed having her take on an empty stomach 30 to 60 minutes before she eats.  She states that she will take it during the day.  She does need to have her thyroid level repeated.  Per our records the last time her TSH was checked was back in October 2019.  At that time her TSH was 8.4.  She reports that her levels have been under control.  Previously noted to have slightly elevated glucose levels.  I discussed with her checking an A1c as well.  Plan to check a CBC and CMP as well.  Patient reports about 4 years ago having a total thyroidectomy.  She saw Dr. Marley on 3/16/2021 for a left sided anterior pea-sized mass on the left thyroid.  She had a soft tissue CT done which showed 2 enhancing foci.  She was seen by Dr. Diallo.  They have considered getting an FNA but this was ultimately not done as they had ultimately elected to pursue follow-up with an ultrasound.  She did have an ultrasound back on 9/24/2021 that showed the indeterminate lesion corresponding to the palpable abnormality with malignancy not being able to be excluded.  She saw Dr. Diallo afterwards and they had discussed monitoring with a 1 year follow-up ultrasound.  She will have a follow-up again with him and September this year and the ultrasound has already been ordered.  She also has issues with neck pain.  She went to urgent care on 5/4/2022 for neck strain.  It is getting better but still bothers her some.  She was given Flexeril and ibuprofen which she admits has helped out.  She does lift some  "for work but she denies any injury that would have caused it.  Objective   Vital Signs:  /66   Pulse 88   Temp 98.3 °F (36.8 °C)   Ht 157.5 cm (62\")   Wt 52.9 kg (116 lb 11.2 oz)   SpO2 97%   BMI 21.34 kg/m²     BMI is within normal parameters. No follow-up required.      Physical Exam   General: AAO ×3, no acute distress, pleasant  HEENT: Normocephalic, atraumatic.  Small pea-sized mobile subcutaneous lesion noted on the left anterior neck.  Nontender to palpation.  Musculoskeletal: Paraspinal hypertonicity of the cervical spine.  She has decreased range of motion with rotation to the right but not the left.  Cardiovascular: Regular rate and rhythm without appreciable murmur  Respiratory: Clear to auscultation bilaterally no RRW  Gastrointestinal: Soft nontender nondistended with bowel sounds present  extremities: No edema  Neurologic: CN II through XII grossly intact   Psychiatric: Normal mood and affect  Result Review :                 Assessment and Plan    Diagnoses and all orders for this visit:    1. Postoperative hypothyroidism (Primary)  -     CBC & Differential  -     Comprehensive Metabolic Panel  -     TSH+Free T4    2. Abnormal glucose  -     Hemoglobin A1c    3. Medication monitoring encounter  -     CBC & Differential  -     Comprehensive Metabolic Panel  -     Hemoglobin A1c  -     TSH+Free T4    4. Strain of neck muscle, sequela    Other orders  -     levothyroxine (SYNTHROID, LEVOTHROID) 125 MCG tablet; Take 1 tablet by mouth Every Morning.  Dispense: 90 tablet; Refill: 1    I discussed with patient getting lab work done today.  Further recommendations to follow depending on results.  She has a strain of her neck on the right side.  We discussed continuing with ibuprofen and Lexapro.  I did discuss with her home exercises to help improve range of motion.  Patient encouraged to follow-up with Dr. Marley in 6 months.         Follow Up   Return in about 6 months (around 11/10/2022) for " hypothyroid.  Patient was given instructions and counseling regarding her condition or for health maintenance advice. Please see specific information pulled into the AVS if appropriate.

## 2022-05-11 DIAGNOSIS — E89.0 POSTOPERATIVE HYPOTHYROIDISM: Primary | ICD-10-CM

## 2022-05-11 LAB — HBA1C MFR BLD: 4.6 % (ref 4.8–5.6)

## 2022-05-11 RX ORDER — LEVOTHYROXINE SODIUM 0.12 MG/1
TABLET ORAL
Qty: 90 TABLET | Refills: 1 | Status: SHIPPED | OUTPATIENT
Start: 2022-05-11 | End: 2022-11-09 | Stop reason: SDUPTHER

## 2022-08-14 DIAGNOSIS — Z30.011 ENCOUNTER FOR INITIAL PRESCRIPTION OF CONTRACEPTIVE PILLS: ICD-10-CM

## 2022-08-15 RX ORDER — LEVONORGESTREL AND ETHINYL ESTRADIOL 0.1-0.02MG
KIT ORAL
Qty: 84 TABLET | Refills: 0 | Status: SHIPPED | OUTPATIENT
Start: 2022-08-15 | End: 2022-11-04

## 2022-08-21 PROCEDURE — 87086 URINE CULTURE/COLONY COUNT: CPT | Performed by: NURSE PRACTITIONER

## 2022-09-01 ENCOUNTER — OFFICE VISIT (OUTPATIENT)
Dept: OBSTETRICS AND GYNECOLOGY | Facility: CLINIC | Age: 22
End: 2022-09-01

## 2022-09-01 VITALS
HEART RATE: 82 BPM | WEIGHT: 117 LBS | BODY MASS INDEX: 21.53 KG/M2 | SYSTOLIC BLOOD PRESSURE: 106 MMHG | DIASTOLIC BLOOD PRESSURE: 70 MMHG | HEIGHT: 62 IN

## 2022-09-01 DIAGNOSIS — R10.2 PELVIC PRESSURE IN FEMALE: Primary | ICD-10-CM

## 2022-09-01 DIAGNOSIS — N89.8 VAGINAL DISCHARGE: ICD-10-CM

## 2022-09-01 DIAGNOSIS — R30.9 PAIN WITH URINATION: ICD-10-CM

## 2022-09-01 DIAGNOSIS — N94.10 DYSPAREUNIA IN FEMALE: ICD-10-CM

## 2022-09-01 LAB
BILIRUB BLD-MCNC: NEGATIVE MG/DL
C TRACH RRNA CVX QL NAA+PROBE: NOT DETECTED
CANDIDA SPECIES: NEGATIVE
CLARITY, POC: CLEAR
COLOR UR: YELLOW
GARDNERELLA VAGINALIS: NEGATIVE
GLUCOSE UR STRIP-MCNC: NEGATIVE MG/DL
KETONES UR QL: NEGATIVE
LEUKOCYTE EST, POC: NEGATIVE
N GONORRHOEA RRNA SPEC QL NAA+PROBE: NOT DETECTED
NITRITE UR-MCNC: NEGATIVE MG/ML
PH UR: 6.5 [PH] (ref 5–8)
PROT UR STRIP-MCNC: NEGATIVE MG/DL
RBC # UR STRIP: NEGATIVE /UL
SP GR UR: 1.02 (ref 1–1.03)
T VAGINALIS DNA VAG QL PROBE+SIG AMP: NEGATIVE
UROBILINOGEN UR QL: NORMAL

## 2022-09-01 PROCEDURE — 87480 CANDIDA DNA DIR PROBE: CPT | Performed by: OBSTETRICS & GYNECOLOGY

## 2022-09-01 PROCEDURE — 87591 N.GONORRHOEAE DNA AMP PROB: CPT | Performed by: OBSTETRICS & GYNECOLOGY

## 2022-09-01 PROCEDURE — 99213 OFFICE O/P EST LOW 20 MIN: CPT | Performed by: OBSTETRICS & GYNECOLOGY

## 2022-09-01 PROCEDURE — 87491 CHLMYD TRACH DNA AMP PROBE: CPT | Performed by: OBSTETRICS & GYNECOLOGY

## 2022-09-01 PROCEDURE — 87510 GARDNER VAG DNA DIR PROBE: CPT | Performed by: OBSTETRICS & GYNECOLOGY

## 2022-09-01 PROCEDURE — 81002 URINALYSIS NONAUTO W/O SCOPE: CPT | Performed by: OBSTETRICS & GYNECOLOGY

## 2022-09-01 PROCEDURE — 87660 TRICHOMONAS VAGIN DIR PROBE: CPT | Performed by: OBSTETRICS & GYNECOLOGY

## 2022-09-01 NOTE — PROGRESS NOTES
"GYN Problem/Follow Up Visit    Chief Complaint   Patient presents with   • PELVIC PRESSURE     PATIENT COMPLAINS OF PAINFUL INTERCOURSE AND PRESSURE WITH URINATION BEEN GOING ON FOR THREE WEEKS             HPI  Fátima Cortes is a 22 y.o. female, , who presents for pelvic pressure and painful sex x 3 weeks. States sex hurt so bad she had to stop. States the pressure in her mid pelvis is still going on. C/o discomfort when she urinates. Thought she had a uti and went to urgent care but they said urine looked okay.        Additional OB/GYN History   No LMP recorded.  Current contraception: contraceptive methods: OCP (estrogen/progesterone)  Desires to: continue contraception  Allergies : Patient has no known allergies.     The additional following portions of the patient's history were reviewed and updated as appropriate: allergies, current medications, past family history, past medical history, past social history, past surgical history and problem list.    Review of Systems    I have reviewed and agree with the HPI, ROS, and historical information as entered above. Lizette Parham, APRN    Objective   /70   Pulse 82   Ht 157.5 cm (62\")   Wt 53.1 kg (117 lb)   BMI 21.40 kg/m²     Physical Exam  Vitals reviewed.   Genitourinary:     General: Normal vulva.      Vagina: No signs of injury and foreign body. Vaginal discharge (moderate amt yellowish d/c) and tenderness present. No erythema, bleeding, lesions or prolapsed vaginal walls.      Cervix: Discharge present. No cervical motion tenderness, friability, lesion, erythema or cervical bleeding.      Uterus: Tender (mild).       Adnexa: Right adnexa normal and left adnexa normal.        Right: No tenderness.          Left: No tenderness.     Skin:     General: Skin is warm and dry.   Neurological:      Mental Status: She is alert and oriented to person, place, and time.            Assessment and Plan    Diagnoses and all orders for this visit:    1. Pelvic " pressure in female (Primary)  -     POC Urinalysis Dipstick  -     US Non-ob Transvaginal; Future  -     Chlamydia trachomatis, Neisseria gonorrhoeae, PCR - Swab, Cervix  -     Gardnerella vaginalis, Trichomonas vaginalis, Candida albicans, DNA - Swab, Vagina    2. Dyspareunia in female  -     US Non-ob Transvaginal; Future  -     Chlamydia trachomatis, Neisseria gonorrhoeae, PCR - Swab, Cervix  -     Gardnerella vaginalis, Trichomonas vaginalis, Candida albicans, DNA - Swab, Vagina    3. Pain with urination  -     Chlamydia trachomatis, Neisseria gonorrhoeae, PCR - Swab, Cervix  -     Gardnerella vaginalis, Trichomonas vaginalis, Candida albicans, DNA - Swab, Vagina    4. Vaginal discharge  -     Chlamydia trachomatis, Neisseria gonorrhoeae, PCR - Swab, Cervix  -     Gardnerella vaginalis, Trichomonas vaginalis, Candida albicans, DNA - Swab, Vagina    reviewed urine multistick done today in the office and it was negative. Will check for infections since there was vaginal d/c present on exam. Will check pelvic u/s. Discussed referral to urologist for eval of IC if our workup is normal.     Counseling:  She understands the importance of having the above orders performed in a timely fashion.  She is encouraged to review her results online and/or contact or office if she has questions.     Follow Up:  Return for u/s f/u.      LEILA Sheth  09/01/2022

## 2022-09-15 ENCOUNTER — HOSPITAL ENCOUNTER (OUTPATIENT)
Dept: ULTRASOUND IMAGING | Facility: HOSPITAL | Age: 22
Discharge: HOME | End: 2022-09-15
Admitting: OTOLARYNGOLOGY

## 2022-09-15 DIAGNOSIS — R22.1 NECK MASS: ICD-10-CM

## 2022-09-15 PROCEDURE — 76536 US EXAM OF HEAD AND NECK: CPT

## 2022-09-20 ENCOUNTER — OFFICE VISIT (OUTPATIENT)
Dept: FAMILY MEDICINE CLINIC | Facility: CLINIC | Age: 22
End: 2022-09-20

## 2022-09-20 ENCOUNTER — HOSPITAL ENCOUNTER (OUTPATIENT)
Dept: ULTRASOUND IMAGING | Facility: HOSPITAL | Age: 22
Discharge: HOME | End: 2022-09-20
Admitting: OBSTETRICS & GYNECOLOGY

## 2022-09-20 VITALS
HEIGHT: 62 IN | HEART RATE: 60 BPM | OXYGEN SATURATION: 99 % | TEMPERATURE: 97.9 F | DIASTOLIC BLOOD PRESSURE: 62 MMHG | SYSTOLIC BLOOD PRESSURE: 98 MMHG | WEIGHT: 114.2 LBS | BODY MASS INDEX: 21.02 KG/M2

## 2022-09-20 DIAGNOSIS — R10.2 PELVIC PRESSURE IN FEMALE: ICD-10-CM

## 2022-09-20 DIAGNOSIS — R30.0 DYSURIA: Primary | ICD-10-CM

## 2022-09-20 DIAGNOSIS — N94.10 DYSPAREUNIA IN FEMALE: ICD-10-CM

## 2022-09-20 LAB
BILIRUB BLD-MCNC: NEGATIVE MG/DL
CLARITY, POC: ABNORMAL
COLOR UR: ABNORMAL
GLUCOSE UR STRIP-MCNC: NEGATIVE MG/DL
KETONES UR QL: NEGATIVE
LEUKOCYTE EST, POC: NEGATIVE
NITRITE UR-MCNC: POSITIVE MG/ML
PH UR: 5.5 [PH] (ref 5–8)
PROT UR STRIP-MCNC: NEGATIVE MG/DL
RBC # UR STRIP: ABNORMAL /UL
SP GR UR: 1.03 (ref 1–1.03)
UROBILINOGEN UR QL: ABNORMAL

## 2022-09-20 PROCEDURE — 99213 OFFICE O/P EST LOW 20 MIN: CPT | Performed by: NURSE PRACTITIONER

## 2022-09-20 PROCEDURE — 76830 TRANSVAGINAL US NON-OB: CPT

## 2022-09-20 PROCEDURE — 81002 URINALYSIS NONAUTO W/O SCOPE: CPT | Performed by: NURSE PRACTITIONER

## 2022-09-20 RX ORDER — NITROFURANTOIN 25; 75 MG/1; MG/1
100 CAPSULE ORAL 2 TIMES DAILY
Qty: 10 CAPSULE | Refills: 0 | Status: SHIPPED | OUTPATIENT
Start: 2022-09-20 | End: 2022-09-25

## 2022-09-20 RX ORDER — AMOXICILLIN 500 MG/1
CAPSULE ORAL
COMMUNITY
Start: 2022-08-01 | End: 2022-09-20

## 2022-09-20 NOTE — PROGRESS NOTES
"Chief Complaint  Urinary Tract Infection    Subjective        Fátima Cortes presents to [facility]  Urinary Tract Infection   This is a new problem. Episode onset: 3 days ago. The problem occurs every urination. The problem has been gradually worsening. The quality of the pain is described as burning. There has been no fever. There is a history of pyelonephritis. Associated symptoms include frequency and hesitancy. Pertinent negatives include no chills, discharge, flank pain, hematuria, nausea, sweats, urgency or vomiting. She has tried nothing for the symptoms. The treatment provided no relief.       Objective   Vital Signs:  BP 98/62   Pulse 60   Temp 97.9 °F (36.6 °C)   Ht 157.5 cm (62\")   Wt 51.8 kg (114 lb 3.2 oz)   SpO2 99%   BMI 20.89 kg/m²   Estimated body mass index is 20.89 kg/m² as calculated from the following:    Height as of this encounter: 157.5 cm (62\").    Weight as of this encounter: 51.8 kg (114 lb 3.2 oz).    BMI is within normal parameters. No other follow-up for BMI required.      Physical Exam  Vitals reviewed.   Constitutional:       Appearance: Normal appearance. She is well-developed.   HENT:      Head: Normocephalic and atraumatic.      Right Ear: External ear normal.      Left Ear: External ear normal.      Mouth/Throat:      Pharynx: No oropharyngeal exudate.   Eyes:      Conjunctiva/sclera: Conjunctivae normal.      Pupils: Pupils are equal, round, and reactive to light.   Cardiovascular:      Rate and Rhythm: Normal rate and regular rhythm.      Heart sounds: No murmur heard.    No friction rub. No gallop.   Pulmonary:      Effort: Pulmonary effort is normal.      Breath sounds: Normal breath sounds. No wheezing or rhonchi.   Abdominal:      General: Bowel sounds are normal. There is no distension.      Palpations: Abdomen is soft.      Tenderness: There is no abdominal tenderness.   Skin:     General: Skin is warm and dry.   Neurological:      " Mental Status: She is alert and oriented to person, place, and time.   Psychiatric:         Mood and Affect: Affect normal.        Result Review :    Common labs    Common Labs 2/12/22 2/12/22 5/10/22 5/10/22 5/10/22    1206 1206 1132 1132 1132   Glucose  129 (A)  74    BUN  8  12    Creatinine  0.63  0.74    eGFR Non African Am  120      Sodium  138  137    Potassium  3.7  4.1    Chloride  102  104    Calcium  9.7  9.6    Albumin  4.80  4.80    Total Bilirubin  0.7  0.6    Alkaline Phosphatase  85  58    AST (SGOT)  18  21    ALT (SGPT)  14  13    WBC 13.32 (A)  7.17     Hemoglobin 12.5  13.1     Hematocrit 37.9  41.0     Platelets 280  330     Hemoglobin A1C     4.60 (A)   (A) Abnormal value            Brief Urine Lab Results  (Last result in the past 365 days)      Color   Clarity   Blood   Leuk Est   Nitrite   Protein   CREAT   Urine HCG        09/20/22 [address] [place]   Cloudy   Trace   Negative   Positive   Negative                             Assessment and Plan   Diagnoses and all orders for this visit:    1. Dysuria (Primary)  -     POCT urinalysis dipstick, manual    Other orders  -     nitrofurantoin, macrocrystal-monohydrate, (Macrobid) 100 MG capsule; Take 1 capsule by mouth 2 (Two) Times a Day for 5 days.  Dispense: 10 capsule; Refill: 0    Start Macrobid 100 mg twice daily.  Send urine culture to [facility] for further evaluation.  Increase fluid intake.  May drink cranberry juice.         Follow Up   Return if symptoms worsen or fail to improve.  Patient was given instructions and counseling regarding her condition or for health maintenance advice. Please see specific information pulled into the AVS if appropriate.

## 2022-09-28 ENCOUNTER — OFFICE VISIT (OUTPATIENT)
Dept: OTOLARYNGOLOGY | Facility: CLINIC | Age: 22
End: 2022-09-28

## 2022-09-28 VITALS — TEMPERATURE: 97.5 F | HEIGHT: 62 IN | BODY MASS INDEX: 21.6 KG/M2 | WEIGHT: 117.4 LBS

## 2022-09-28 DIAGNOSIS — R22.1 NECK MASS: Primary | ICD-10-CM

## 2022-09-28 PROCEDURE — 99213 OFFICE O/P EST LOW 20 MIN: CPT | Performed by: OTOLARYNGOLOGY

## 2022-09-28 NOTE — PROGRESS NOTES
Patient Name: Fátima Cortes   Visit Date: 09/28/2022   Patient ID: [id number]  Provider: Dhaval Diallo MD    Sex: female  Location: [facility]   YOB: 2000  Location Address: [address] [place] [address], [address],?[place]?[address]    Primary Care Provider Daily Marley MD  Location Phone: [phone]    Referring Provider: No ref. provider found        Chief Complaint  1 year follow up w/ US results    History of Present Illness  Fátima Cortes is a 22 y.o. female with past medical history significant for total thyroidectomy secondary to thyroid nodules who returns today for evaluation. She was originally seen on 5/8/18 at which time she had initially noticed a left-sided neck mass at the beginning of February. She had suffered from strep throat a month before but was not sick at the time she discovered the mass. She ended up taking a 10 day course of amoxicillin starting on 3/27/18 without improvement. They had decreased in size and were never painful. She has undergone extensive workup between [facility] and [facility] including negative CMV DNA, negative Bartonella henselae and quinana on 4/17/18. She also was noted to have increased EBV IgG but not IgM. She also underwent an ultrasound that [facility] on 4/13/18 which revealed a left 2.1 x 0.9 x 2 cm lymph node. She was seen by Dr. Rivera where she underwent a hematologic evaluation for leukemia and lymphoma which was negative. On examination that day there were bilateral level II lymph nodes which were approximately 1 cm diameter rubbery to palpation. Options for management were discussed and she decided to continue observation and a repeat ultrasound was scheduled for 6 weeks. Repeat ultrasound of the neck on 8/8/18 revealed 2 abnormal-appearing hypo-echoic nodes in the left neck measuring 1.6 x 1.2 x 0.6 cm and 1.1 x 1.1 x 0.4 cm. ultrasound-guided FNA this was performed on 9/12/18 and was negative for malignant cells.  There  "was noted to be a polymorphous lymphoid population most consistent with benign reactive lymph node.  CT scan of the neck with contrast on 3/23/2021 revealed 2 separate enhancing masses in the area of the previous thyroid on the left with the largest measuring up to 1.6 cm and appeared consistent with residual thyroid tissue.  She does have a previous history of total thyroidectomy she believes at [facility].    She was set up to undergo an FNA but it was felt like a thyroid uptake scan would be more helpful.  We discussed things and she elected to pursue a follow-up ultrasound.    She returns today for follow-up having last been seen on 9/29/2021.  She tells me that she has done well since her last appointment.  She has not had any issues with neck soreness, dysphagia, hoarseness, or neck stiffness.  Neck ultrasound on 9/15/2022 revealed a 2.1 x 1.7 x 0.7 cm hypoechoic neck mass which is relatively stable.  Thyroid function testing on 5/10/2022 revealed an elevated TSH of 18.800 but normal free T4 of 1.28.    Past Medical History:   Diagnosis Date   • Back pain    • ETD (Eustachian tube dysfunction), bilateral    • Lymphadenopathy of head and neck    • Multiple thyroid nodules 3/13/2017   • Recurrent tonsillitis    • Viral pharyngitis        Past Surgical History:   Procedure Laterality Date   • EAR TUBES  2002   • THYROID SURGERY  2017    \"Done in [place]\"         Current Outpatient Medications:   •  levothyroxine (SYNTHROID, LEVOTHROID) 125 MCG tablet, Take 1 PO daily Mon-Sat. Take 2 PO on Sundays, Disp: 90 tablet, Rfl: 1  •  Vienva 0.1-20 MG-MCG per tablet, TAKE 1 TABLET BY MOUTH DAILY, Disp: 84 tablet, Rfl: 0     No Known Allergies    Social History     Tobacco Use   • Smoking status: Never Smoker   • Smokeless tobacco: Never Used   Vaping Use   • Vaping Use: Never used   Substance Use Topics   • Alcohol use: Never   • Drug use: Never        Objective     Vital Signs:   Temp 97.5 °F (36.4 " "°C) (Temporal)   Ht 157.5 cm (62\")   Wt 53.3 kg (117 lb 6.4 oz)   BMI 21.47 kg/m²       Physical Exam    General: Well developed, well nourished patient of stated age in no acute distress. Voice is strong and clear.   Head: Normocephalic and atraumatic.  Face: No lesions.  Bilateral parotid and submandibular glands are unremarkable.  Stensen's and Warthin's ducts are productive of clear saliva bilaterally.  House-Brackmann I/VI     bilaterally.   muscles and temporomandibular joint nontender to palpation.  No TMJ crepitus.  Eyes: PERRLA, sclerae anicteric, no conjunctival injection. Extra ocular movements are intact and full. No nystagmus.   Ears: Auricles are normal in appearance. Bilateral external auditory canals are unremarkable. Bilateral tympanic membranes are clear and without effusion. Hearing normal to conversational voice.   Nose: External nose is normal in appearance. Bilateral nares are patent with normal appearing mucosa. Septum midline. Turbinates are unremarkable. No lesions.   Oral Cavity: Lips are normal in appearance. Oral mucosa is unremarkable. Gingiva is unremarkable. Normal dentition for age. Tongue is unremarkable with good movement. Hard palate is unremarkable.   Oropharynx: Soft palate is unremarkable with full movement. Uvula is unremarkable. Bilateral tonsils are unremarkable. Posterior oropharynx is unremarkable.    Larynx and hypopharynx: Deferred secondary to gag reflex.  Neck: Supple.  Approximately 1.5 soft nontender mass overlying the left thyroid cartilage which is mobile. nontender to palpation.  Trachea midline. Thyroid surgically absent with a faint midline scar.   Lymphatic: No lymphadenopathy upon palpation.   Psychiatric: Appropriate affect, cooperative   Neurologic: Oriented x 3, strength symmetric in all extremities, Cranial Nerves II-XII are grossly intact to confrontation   Skin: Warm and dry. No rashes.    Procedures           Result Review :             "   Assessment and Plan    Diagnoses and all orders for this visit:    1. Neck mass (Primary)  -     US Head Neck Soft Tissue; Future      Impressions and findings were discussed.  We reviewed and discussed the images from her recent ultrasound which revealed a 2.1 cm ovoid structure in the area of concern with a small amount of vascular flow.  We again discussed that this is likely consistent with residual thyroid tissue which would explain the minimal change in size with an increase in her TSH.  We discussed the potential for increasing her levothyroxine dose but she tells me that the last time it was increased she ended up losing approximately 20 pounds and would like to avoid the situation.  In regards to the mass she would feel more comfortable keeping an eye on things and will therefore follow-up in 1 year with a repeat ultrasound or sooner if needed.    Follow Up   Return in about 1 year (around 9/28/2023).  Patient was given instructions and counseling regarding her condition or for health maintenance advice. Please see specific information pulled into the AVS if appropriate.

## 2022-10-10 ENCOUNTER — TELEPHONE (OUTPATIENT)
Dept: OBSTETRICS AND GYNECOLOGY | Facility: CLINIC | Age: 22
End: 2022-10-10

## 2022-10-10 NOTE — TELEPHONE ENCOUNTER
Caller: AJ MEYER    Relationship to patient: SELF    Best call back number: [phone]    Patient is needing: SAME DAY CANCELLATION-SICK    RESCHEDULED

## 2022-10-17 ENCOUNTER — TELEPHONE (OUTPATIENT)
Dept: OBSTETRICS AND GYNECOLOGY | Facility: CLINIC | Age: 22
End: 2022-10-17

## 2022-10-17 NOTE — TELEPHONE ENCOUNTER
Caller: Fátima Cortes    Relationship to patient: Self    Best call back number: [phone]    Patient is needing: HUB UNABLE TO WT - PT HAD AN ANNUAL EXAM SCHEUDLED FOR 10/17/22 AND WAS A NO SHOW. PT ALSO HAS AN APPT ON 10/18/22 FOR A F/U AND IS REQUESTING IF SHE CAN HAVE HER ANNUAL EXAM AT THIS APPT AS WELL. PT CAN BE REACHED ANYTIME, OK TO LVM.

## 2022-10-18 ENCOUNTER — OFFICE VISIT (OUTPATIENT)
Dept: OBSTETRICS AND GYNECOLOGY | Facility: CLINIC | Age: 22
End: 2022-10-18

## 2022-10-18 VITALS
BODY MASS INDEX: 21.35 KG/M2 | HEART RATE: 66 BPM | HEIGHT: 62 IN | WEIGHT: 116 LBS | DIASTOLIC BLOOD PRESSURE: 78 MMHG | SYSTOLIC BLOOD PRESSURE: 119 MMHG

## 2022-10-18 DIAGNOSIS — N94.10 DYSPAREUNIA IN FEMALE: Primary | ICD-10-CM

## 2022-10-18 DIAGNOSIS — R10.2 PELVIC PRESSURE IN FEMALE: ICD-10-CM

## 2022-10-18 DIAGNOSIS — R39.9 UTI SYMPTOMS: ICD-10-CM

## 2022-10-18 PROCEDURE — 99212 OFFICE O/P EST SF 10 MIN: CPT | Performed by: OBSTETRICS & GYNECOLOGY

## 2022-10-18 NOTE — PROGRESS NOTES
"GYN Problem/Follow Up Visit    Chief Complaint   Patient presents with   • FOLLOW UP ULTRA SOUND           HPI  Fátima Cortes is a 22 y.o. female, , who presents for u/s f/u. Seen in office 22 with c/o pelvic pressure and painful intercourse for a few weeks. Was also having uti sx but had urine checked twice and it was negative for infection. Vaginal swabs were negative.         Additional OB/GYN History   No LMP recorded.  Current contraception: contraceptive methods: OCP (estrogen/progesterone)  Desires to: continue contraception  Allergies : Patient has no known allergies.     The additional following portions of the patient's history were reviewed and updated as appropriate: allergies, current medications, past family history, past medical history, past social history, past surgical history and problem list.    Review of Systems    I have reviewed and agree with the HPI, ROS, and historical information as entered above. Lizette Parham, APRN    Objective   /78   Pulse 66   Ht 157.5 cm (62\")   Wt 52.6 kg (116 lb)   BMI 21.22 kg/m²     Physical Exam  Vitals reviewed.   Neurological:      Mental Status: She is alert and oriented to person, place, and time.            Assessment and Plan    Diagnoses and all orders for this visit:    1. Dyspareunia in female (Primary)  -     Ambulatory Referral to Urology    2. UTI symptoms  -     Ambulatory Referral to Urology    3. Pelvic pressure in female  -     Ambulatory Referral to Urology    reviewed pelvic u/s done 22: normal. Discussed other causes of her sx such as IC. Discussed eval by urologist and pt desires. rto prn.     Counseling:  She understands the importance of having the above orders performed in a timely fashion.  She is encouraged to review her results online and/or contact or office if she has questions.     Follow Up:  Return if symptoms worsen or fail to improve.      Lizette Parham, APRN  10/18/2022  "

## 2022-11-04 DIAGNOSIS — Z30.011 ENCOUNTER FOR INITIAL PRESCRIPTION OF CONTRACEPTIVE PILLS: ICD-10-CM

## 2022-11-04 RX ORDER — LEVONORGESTREL AND ETHINYL ESTRADIOL 0.1-0.02MG
KIT ORAL
Qty: 84 TABLET | Refills: 0 | Status: SHIPPED | OUTPATIENT
Start: 2022-11-04 | End: 2023-01-23

## 2022-11-09 RX ORDER — LEVOTHYROXINE SODIUM 0.12 MG/1
TABLET ORAL
Qty: 90 TABLET | Refills: 1 | Status: SHIPPED | OUTPATIENT
Start: 2022-11-09

## 2022-11-21 ENCOUNTER — TELEPHONE (OUTPATIENT)
Dept: FAMILY MEDICINE CLINIC | Facility: CLINIC | Age: 22
End: 2022-11-21

## 2022-11-21 NOTE — TELEPHONE ENCOUNTER
Patient missed appointment on 11/10/2022 @ 0915 with Dr Marley. Called number in chart. No answer, left message explaining policy concerning missed appointments and same day cancellations.

## 2023-01-21 DIAGNOSIS — Z30.011 ENCOUNTER FOR INITIAL PRESCRIPTION OF CONTRACEPTIVE PILLS: ICD-10-CM

## 2023-01-23 RX ORDER — LEVONORGESTREL AND ETHINYL ESTRADIOL 0.1-0.02MG
KIT ORAL
Qty: 84 TABLET | Refills: 0 | Status: SHIPPED | OUTPATIENT
Start: 2023-01-23

## 2023-04-08 DIAGNOSIS — Z30.011 ENCOUNTER FOR INITIAL PRESCRIPTION OF CONTRACEPTIVE PILLS: ICD-10-CM

## 2023-04-10 RX ORDER — LEVONORGESTREL AND ETHINYL ESTRADIOL 0.1-0.02MG
KIT ORAL
Qty: 84 TABLET | Refills: 0 | OUTPATIENT
Start: 2023-04-10

## 2023-04-11 ENCOUNTER — OFFICE VISIT (OUTPATIENT)
Dept: OBSTETRICS AND GYNECOLOGY | Facility: CLINIC | Age: 23
End: 2023-04-11
Payer: COMMERCIAL

## 2023-04-11 VITALS
HEIGHT: 62 IN | DIASTOLIC BLOOD PRESSURE: 83 MMHG | WEIGHT: 131 LBS | SYSTOLIC BLOOD PRESSURE: 124 MMHG | HEART RATE: 61 BPM | BODY MASS INDEX: 24.11 KG/M2

## 2023-04-11 DIAGNOSIS — Z01.419 ENCOUNTER FOR GYNECOLOGICAL EXAMINATION WITHOUT ABNORMAL FINDING: Primary | ICD-10-CM

## 2023-04-11 DIAGNOSIS — R82.90 ABNORMAL URINE ODOR: ICD-10-CM

## 2023-04-11 DIAGNOSIS — R82.998 DARK URINE: ICD-10-CM

## 2023-04-11 DIAGNOSIS — Z30.41 ENCOUNTER FOR SURVEILLANCE OF CONTRACEPTIVE PILLS: ICD-10-CM

## 2023-04-11 LAB
BILIRUB BLD-MCNC: NEGATIVE MG/DL
GLUCOSE UR STRIP-MCNC: NEGATIVE MG/DL
KETONES UR QL: NEGATIVE
LEUKOCYTE EST, POC: NEGATIVE
NITRITE UR-MCNC: NEGATIVE MG/ML
PH UR: 6 [PH] (ref 5–8)
PROT UR STRIP-MCNC: NEGATIVE MG/DL
RBC # UR STRIP: ABNORMAL /UL
SP GR UR: 1.03 (ref 1–1.03)
UROBILINOGEN UR QL: NORMAL

## 2023-04-11 PROCEDURE — 87591 N.GONORRHOEAE DNA AMP PROB: CPT | Performed by: OBSTETRICS & GYNECOLOGY

## 2023-04-11 PROCEDURE — G0123 SCREEN CERV/VAG THIN LAYER: HCPCS | Performed by: OBSTETRICS & GYNECOLOGY

## 2023-04-11 PROCEDURE — 87491 CHLMYD TRACH DNA AMP PROBE: CPT | Performed by: OBSTETRICS & GYNECOLOGY

## 2023-04-11 RX ORDER — LEVONORGESTREL AND ETHINYL ESTRADIOL 0.1-0.02MG
1 KIT ORAL DAILY
Qty: 84 TABLET | Refills: 4 | Status: SHIPPED | OUTPATIENT
Start: 2023-04-11

## 2023-04-11 NOTE — PROGRESS NOTES
"Well Woman Visit    CC: Annual well woman exam       HPI:   23 y.o. Contraception or HRT: Contraception:  Birth control pill  Menses:   q mon, lasts 4 days, no heavy days  Pain:  Mild, OTC meds control discomfort  Incontinence concerns: No  Hx of abnormal pap:  No  Pt has concerns she would like to discuss. states today she noticed her urine was darker than usual and had an odor. Denies dysuria.       History: PMHx, Meds, Allergies, PSHx, Social Hx, and POBHx all reviewed and updated.      PHYSICAL EXAM:  /83   Pulse 61   Ht 157.5 cm (62\")   Wt 59.4 kg (131 lb)   LMP 2023 (Approximate)   BMI 23.96 kg/m²   General- NAD, alert and oriented, appropriate  Psych- Normal mood, good memory  Neck- No masses, no thyroid enlargement  CV- Regular rhythm, no murnurs  Resp- CTA to bases, no wheezes  Abdomen- Soft, non distended, non tender, no masses    Breast left-  Bilaterally symmetrical, no masses, non tender, no nipple discharge  Breast right- Bilaterally symmetrical, no masses, non tender, no nipple discharge    External genitalia- Normal female, no lesions  Urethra/meatus- Normal, no masses, non tender, no prolapse  Bladder- Normal, no masses, non tender, no prolapse  Vagina- Normal, no atrophy, no lesions, no discharge, no prolapse  Cvx- Normal, no lesions, no discharge, No cervical motion tenderness  Uterus- Normal size, shape & consistency.  Non tender, mobile, & no prolapse  Adnexa- No mass, non tender  Anus/Rectum/Perineum- Not performed    Lymphatic- No palpable neck, axillary, or groin nodes  Ext- No edema, no cyanosis    Skin- No lesions, no rashes, no acanthosis nigricans        ASSESSMENT and PLAN:  WWE, Contraception and Problem Visit    Diagnoses and all orders for this visit:    1. Encounter for gynecological examination without abnormal finding (Primary)  -     IGP, CtNg, Rfx Aptima HPV ASCU    2. Encounter for surveillance of contraceptive pills  -     levonorgestrel-ethinyl " estradiol (Vienva) 0.1-20 MG-MCG per tablet; Take 1 tablet by mouth Daily.  Dispense: 84 tablet; Refill: 4    3. Abnormal urine odor    4. Dark urine    happy with current bcp and desires to continue. Reviewed urine multistick done today in the office and it was normal. Increase fluids and rto prn.     Counseling:     OCP/Hormone use risk LANRE  Track menses, RTO IF <q21d, >7d long, or heavy    Domestic violence/abuse screen: negative    Depression screen: no SI    Preventative:   BREAST HEALTH- Monthly self breast exam importance and how to reviewed. MMG and/or MRI (prn) reviewed per society guidelines and her individual history. Mammo/MRI screen: Not medically needed.  CERVICAL CANCER Screening- Reviewed current ASCCP guidelines for screening w and wo cotest HPV, age specific.  Screen: Updated today.  COLON CANCER Screening- Reviewed current medical society guidelines and options.  Colonoscopy screen:  Not medically needed.  SEXUAL HEALTH: STD screening recommended.  Ordered.  VACCINATIONS Recommended: Flu annually, Gardisil/HPV vaccine (up to 46yo).  Importance discussed, risk being unvaccinated reviewed.  Questions answered  Smoking status- NON SMOKER.  Importance of avoiding second hand smoke.  Myriad: Does not qualify.  Gardasil status: completed.      She understands the importance of having any ordered tests to be performed in a timely fashion.  She is encouraged to review her results online and/or contact or office if she has questions.     Follow Up:  Return if symptoms worsen or fail to improve.      Lizette Parham, APRN  04/11/2023

## 2023-04-13 NOTE — TELEPHONE ENCOUNTER
Patient missed appointment on 11/10/2022 @ 0915 with Dr. Marley. Called number in chart, no answer. Second attempt - sending letter  
no

## 2023-04-17 LAB
C TRACH RRNA CVX QL NAA+PROBE: NEGATIVE
CONV .: NORMAL
CYTOLOGIST CVX/VAG CYTO: NORMAL
CYTOLOGY CVX/VAG DOC CYTO: NORMAL
CYTOLOGY CVX/VAG DOC THIN PREP: NORMAL
DX ICD CODE: NORMAL
HIV 1 & 2 AB SER-IMP: NORMAL
N GONORRHOEA RRNA CVX QL NAA+PROBE: NEGATIVE
OTHER STN SPEC: NORMAL
STAT OF ADQ CVX/VAG CYTO-IMP: NORMAL

## 2023-05-19 ENCOUNTER — OFFICE VISIT (OUTPATIENT)
Dept: FAMILY MEDICINE CLINIC | Facility: CLINIC | Age: 23
End: 2023-05-19
Payer: COMMERCIAL

## 2023-05-19 VITALS
DIASTOLIC BLOOD PRESSURE: 70 MMHG | RESPIRATION RATE: 14 BRPM | HEIGHT: 62 IN | OXYGEN SATURATION: 99 % | TEMPERATURE: 97.9 F | BODY MASS INDEX: 23.55 KG/M2 | WEIGHT: 128 LBS | SYSTOLIC BLOOD PRESSURE: 120 MMHG | HEART RATE: 88 BPM

## 2023-05-19 DIAGNOSIS — R79.89 ABNORMAL CBC: ICD-10-CM

## 2023-05-19 DIAGNOSIS — E04.2 MULTIPLE THYROID NODULES: ICD-10-CM

## 2023-05-19 DIAGNOSIS — E16.2 HYPOGLYCEMIA: Primary | ICD-10-CM

## 2023-05-19 LAB
ALBUMIN SERPL-MCNC: 4.6 G/DL (ref 3.5–5.2)
ALBUMIN/GLOB SERPL: 1.7 G/DL
ALP SERPL-CCNC: 70 U/L (ref 39–117)
ALT SERPL W P-5'-P-CCNC: 18 U/L (ref 1–33)
ANION GAP SERPL CALCULATED.3IONS-SCNC: 9 MMOL/L (ref 5–15)
AST SERPL-CCNC: 25 U/L (ref 1–32)
BASOPHILS # BLD AUTO: 0.06 10*3/MM3 (ref 0–0.2)
BASOPHILS NFR BLD AUTO: 0.7 % (ref 0–1.5)
BILIRUB SERPL-MCNC: 0.5 MG/DL (ref 0–1.2)
BUN SERPL-MCNC: 12 MG/DL (ref 6–20)
BUN/CREAT SERPL: 15.8 (ref 7–25)
CALCIUM SPEC-SCNC: 9.5 MG/DL (ref 8.6–10.5)
CHLORIDE SERPL-SCNC: 105 MMOL/L (ref 98–107)
CO2 SERPL-SCNC: 24 MMOL/L (ref 22–29)
CREAT SERPL-MCNC: 0.76 MG/DL (ref 0.57–1)
DEPRECATED RDW RBC AUTO: 38.8 FL (ref 37–54)
EGFRCR SERPLBLD CKD-EPI 2021: 113.8 ML/MIN/1.73
EOSINOPHIL # BLD AUTO: 0.18 10*3/MM3 (ref 0–0.4)
EOSINOPHIL NFR BLD AUTO: 2 % (ref 0.3–6.2)
ERYTHROCYTE [DISTWIDTH] IN BLOOD BY AUTOMATED COUNT: 12.4 % (ref 12.3–15.4)
GLOBULIN UR ELPH-MCNC: 2.7 GM/DL
GLUCOSE SERPL-MCNC: 71 MG/DL (ref 65–99)
HBA1C MFR BLD: 4.7 % (ref 4.8–5.6)
HCT VFR BLD AUTO: 38.5 % (ref 34–46.6)
HGB BLD-MCNC: 12.9 G/DL (ref 12–15.9)
IMM GRANULOCYTES # BLD AUTO: 0.03 10*3/MM3 (ref 0–0.05)
IMM GRANULOCYTES NFR BLD AUTO: 0.3 % (ref 0–0.5)
LYMPHOCYTES # BLD AUTO: 1.49 10*3/MM3 (ref 0.7–3.1)
LYMPHOCYTES NFR BLD AUTO: 16.4 % (ref 19.6–45.3)
MCH RBC QN AUTO: 28.8 PG (ref 26.6–33)
MCHC RBC AUTO-ENTMCNC: 33.5 G/DL (ref 31.5–35.7)
MCV RBC AUTO: 85.9 FL (ref 79–97)
MONOCYTES # BLD AUTO: 1.12 10*3/MM3 (ref 0.1–0.9)
MONOCYTES NFR BLD AUTO: 12.3 % (ref 5–12)
NEUTROPHILS NFR BLD AUTO: 6.21 10*3/MM3 (ref 1.7–7)
NEUTROPHILS NFR BLD AUTO: 68.3 % (ref 42.7–76)
NRBC BLD AUTO-RTO: 0 /100 WBC (ref 0–0.2)
PLATELET # BLD AUTO: 299 10*3/MM3 (ref 140–450)
PMV BLD AUTO: 11.3 FL (ref 6–12)
POTASSIUM SERPL-SCNC: 3.8 MMOL/L (ref 3.5–5.2)
PROT SERPL-MCNC: 7.3 G/DL (ref 6–8.5)
RBC # BLD AUTO: 4.48 10*6/MM3 (ref 3.77–5.28)
SODIUM SERPL-SCNC: 138 MMOL/L (ref 136–145)
TSH SERPL DL<=0.05 MIU/L-ACNC: 2.96 UIU/ML (ref 0.27–4.2)
WBC NRBC COR # BLD: 9.09 10*3/MM3 (ref 3.4–10.8)

## 2023-05-19 PROCEDURE — 83036 HEMOGLOBIN GLYCOSYLATED A1C: CPT | Performed by: FAMILY MEDICINE

## 2023-05-19 PROCEDURE — 80050 GENERAL HEALTH PANEL: CPT | Performed by: FAMILY MEDICINE

## 2023-05-19 NOTE — PROGRESS NOTES
Chief Complaint  Chief Complaint   Patient presents with   • Weight Gain   • Sinus Problem   • Thyroid Problem     Feels like somethin is off , she take thyroid meds 1 pill daily x7days a week       HPI:  Fátima Cortes presents to [facility].     The patient thinks that she may have a sinus infection and states that her symptoms started this morning. She has been blowing her nose and her nasal mucus is clear.     The patient has not had her thyroid level checked recently. She no longer is seen by her thyroid specialist. Her thyroid was last checked in 2022 and was abnormal. She is currently taking levothyroxine 125 mcg once a day. The patient has obtained an thyroid ultrasound with Dr. Diallo. She is suppose to obtain a repeat ultrasound in 09/2023.     Her blood glucose was low when her laboratories were last checked.     Procedures     Past History:  Medical History: has a past medical history of Back pain, ETD (Eustachian tube dysfunction), bilateral, Lymphadenopathy of head and neck, Multiple thyroid nodules (03/13/2017), Recurrent tonsillitis, and Viral pharyngitis.   Surgical History: has a past surgical history that includes Ear Tubes Removal (2002) and Thyroid surgery (2017).   Family History: family history includes Lung cancer in an other family member; Thyroid cancer in an other family member.   Social History: reports that she has never smoked. She has never used smokeless tobacco. She reports that she does not drink alcohol and does not use drugs.  Immunization History   Administered Date(s) Administered   • Flu Vaccine Intradermal Quad 18-64YR 02/04/2015   • HPV Quadrivalent 02/04/2015, 03/17/2015   • Hep A, 2 Dose 08/27/2014, 03/17/2015   • Influenza Seasonal Injectable 02/04/2015   • MCV4 Unspecified 08/27/2014   • Meningococcal B,(Bexsero) 11/04/2019   • Meningococcal MCV4P (Menactra) 08/27/2014   • Tdap 08/27/2014   • Varicella 08/27/2014         Allergies:  "Patient has no known allergies.     Medications:  Current Outpatient Medications on File Prior to Visit   Medication Sig Dispense Refill   • levonorgestrel-ethinyl estradiol (Vienva) 0.1-20 MG-MCG per tablet Take 1 tablet by mouth Daily. 84 tablet 4   • levothyroxine (SYNTHROID, LEVOTHROID) 125 MCG tablet Take 1 PO daily Mon-Sat. Take 2 PO on Sundays (Patient taking differently: Take 1 tablet by mouth Daily. Take 1 PO daily Mon-Sat. Take 2 PO on Sundays) 90 tablet 1     No current facility-administered medications on file prior to visit.        Health Maintenance Due   Topic Date Due   • HPV VACCINES (3 - 2-dose series) 08/04/2015   • ANNUAL PHYSICAL  Never done       Vital Signs:   Vitals:    05/19/23 1021   BP: 120/70   BP Location: Left arm   Patient Position: Sitting   Pulse: 88   Resp: 14   Temp: 97.9 °F (36.6 °C)   SpO2: 99%   Weight: 58.1 kg (128 lb)   Height: 157.5 cm (62\")      Body mass index is 23.41 kg/m².     ROS:  Review of Systems   Constitutional: Negative for fatigue and fever.   HENT: Negative for congestion, ear pain and sinus pressure.    Respiratory: Negative for cough, chest tightness and shortness of breath.    Cardiovascular: Negative for chest pain, palpitations and leg swelling.   Gastrointestinal: Negative for abdominal pain and diarrhea.   Genitourinary: Negative for dysuria and frequency.   Neurological: Negative for speech difficulty, headache and confusion.   Psychiatric/Behavioral: Negative for agitation and behavioral problems.          Physical Exam  Vitals reviewed.   Constitutional:       Appearance: Normal appearance.   HENT:      Right Ear: Tympanic membrane normal.      Left Ear: Tympanic membrane normal.      Nose: Nose normal.   Eyes:      Extraocular Movements: Extraocular movements intact.      Conjunctiva/sclera: Conjunctivae normal.      Pupils: Pupils are equal, round, and reactive to light.   Cardiovascular:      Rate and Rhythm: Normal rate and regular rhythm. "   Pulmonary:      Effort: Pulmonary effort is normal.      Breath sounds: Normal breath sounds.   Abdominal:      General: Bowel sounds are normal.   Musculoskeletal:         General: Normal range of motion.      Cervical back: Normal range of motion.   Skin:     General: Skin is warm and dry.   Neurological:      General: No focal deficit present.      Mental Status: She is alert and oriented to person, place, and time.   Psychiatric:         Mood and Affect: Mood normal.         Behavior: Behavior normal.          Result Review   Hemoglobin A1c (05/10/2022 11:32)  TSH+Free T4 (05/10/2022 11:32)  Comprehensive Metabolic Panel (05/10/2022 11:32)  CBC & Differential (05/10/2022 11:32)       Diagnoses and all orders for this visit:    1. Hypoglycemia (Primary)  -     TSH  -     Comprehensive Metabolic Panel  -     Hemoglobin A1c    2. Multiple thyroid nodules    3. Abnormal CBC  -     CBC Auto Differential    Thyroid problem   - I am checking a repeat thyroid panel. She is currently taking levothyroxine 125 mcg once daily. She was informed that she is due for a repeat thyroid ultrasound in 09/2023. She is not scheduled for the ultrasound at this time and we will contact and remind the patient closer to 09/2023.     Allergies  - The patient was prescribed allergy medication today.     Contraception  - The patient will continue current contraception regimen.     Health maintenance   - I am ordering laboratories and checking her thyroid levels, complete blood count and blood glucose level. She will follow-up in 1 month.       Smoking Cessation:    Fátima Cortes  reports that she has never smoked. She has never used smokeless tobacco.        Follow Up   Return for Annual physical.  Patient was given instructions and counseling regarding her condition or for health maintenance advice. Please see specific information pulled into the AVS if appropriate.       Daily Marley MD       Transcribed from ambient dictation  for Daily Marley MD by Sakina Adair.  05/19/23   13:40 EDT    Patient or patient representative verbalized consent to the visit recording.  I have personally performed the services described in this document as transcribed by the above individual, and it is both accurate and complete.

## 2023-06-09 ENCOUNTER — TELEPHONE (OUTPATIENT)
Dept: FAMILY MEDICINE CLINIC | Facility: CLINIC | Age: 23
End: 2023-06-09

## 2023-06-09 NOTE — TELEPHONE ENCOUNTER
Caller: Fátima Cortes    Relationship: Self    Best call back number: [phone]     What medication are you requesting: ANTIBIOTIC    What are your current symptoms: GUMS HURTING, HEADACHES, CONGESTION    How long have you been experiencing symptoms: 2 WEEKS    Have you had these symptoms before:    [x] Yes  [] No    Have you been treated for these symptoms before:   [] Yes  [] No    If a prescription is needed, what is your preferred pharmacy and phone number: [id number] - [place], [place] - [address] AT [facility] [phone] [facility] [phone]      Additional notes:    PATIENT STATES SHE HAS BEEN TAKING OTC SUDAFED AND AFRIN MEDICATIONS BUT THEY AREN'T HELPING. PATIENT STATES HER SYMPTOMS HAVE ACTUALLY GOTTEN WORSE SINCE HER LAST VISIT.

## 2023-09-25 ENCOUNTER — HOSPITAL ENCOUNTER (OUTPATIENT)
Dept: ULTRASOUND IMAGING | Facility: HOSPITAL | Age: 23
Discharge: HOME | End: 2023-09-25
Admitting: OTOLARYNGOLOGY
Payer: COMMERCIAL

## 2023-09-25 DIAGNOSIS — R22.1 NECK MASS: ICD-10-CM

## 2023-09-25 PROCEDURE — 76536 US EXAM OF HEAD AND NECK: CPT

## 2023-09-25 RX ORDER — LEVOTHYROXINE SODIUM 0.12 MG/1
TABLET ORAL
Qty: 90 TABLET | Refills: 1 | Status: SHIPPED | OUTPATIENT
Start: 2023-09-25

## 2023-10-02 ENCOUNTER — OFFICE VISIT (OUTPATIENT)
Dept: OTOLARYNGOLOGY | Facility: CLINIC | Age: 23
End: 2023-10-02
Payer: COMMERCIAL

## 2023-10-02 VITALS — TEMPERATURE: 97.5 F | DIASTOLIC BLOOD PRESSURE: 88 MMHG | HEART RATE: 76 BPM | SYSTOLIC BLOOD PRESSURE: 131 MMHG

## 2023-10-02 DIAGNOSIS — J34.89 NASAL DRYNESS: ICD-10-CM

## 2023-10-02 DIAGNOSIS — R68.84 JAW PAIN: ICD-10-CM

## 2023-10-02 DIAGNOSIS — R22.1 NECK MASS: Primary | ICD-10-CM

## 2023-10-02 PROCEDURE — 99213 OFFICE O/P EST LOW 20 MIN: CPT | Performed by: OTOLARYNGOLOGY

## 2023-10-02 RX ORDER — CYCLOBENZAPRINE HCL 10 MG
10 TABLET ORAL
Qty: 14 TABLET | Refills: 3 | Status: SHIPPED | OUTPATIENT
Start: 2023-10-02

## 2023-10-02 RX ORDER — LORATADINE 10 MG/1
1 TABLET ORAL DAILY
COMMUNITY
Start: 2023-06-09

## 2023-10-02 NOTE — PROGRESS NOTES
Patient Name: Fátima Cortes   Visit Date: 10/02/2023   Patient ID: [id number]  Provider: Dhaval Diallo MD    Sex: female  Location: [facility]   YOB: 2000  Location Address: [address] [place] [address], [address],?[place]?[address]    Primary Care Provider Daily Marley MD  Location Phone: [phone]    Referring Provider: No ref. provider found        Chief Complaint    At this time, she would feel more comfortable1 year follow up w/ US and Sinus problems    History of Present Illness  Fátima Cortes is a 23 y.o. female with past medical history significant for total thyroidectomy secondary to thyroid nodules who returns today for evaluation. She was originally seen on 5/8/18 at which time she had initially noticed a left-sided neck mass at the beginning of February. She had suffered from strep throat a month before but was not sick at the time she discovered the mass. She ended up taking a 10 day course of amoxicillin starting on 3/27/18 without improvement. They had decreased in size and were never painful. She has undergone extensive workup between [facility] and [facility] including negative CMV DNA, negative Bartonella henselae and quinana on 4/17/18. She also was noted to have increased EBV IgG but not IgM. She also underwent an ultrasound that [facility] on 4/13/18 which revealed a left 2.1 x 0.9 x 2 cm lymph node. She was seen by Dr. Rivera where she underwent a hematologic evaluation for leukemia and lymphoma which was negative. On examination that day there were bilateral level II lymph nodes which were approximately 1 cm diameter rubbery to palpation. Options for management were discussed and she decided to continue observation and a repeat ultrasound was scheduled for 6 weeks. Repeat ultrasound of the neck on 8/8/18 revealed 2 abnormal-appearing hypo-echoic nodes in the left neck measuring 1.6 x 1.2 x 0.6 cm and 1.1 x 1.1 x 0.4 cm. ultrasound-guided FNA this was performed  on 9/12/18 and was negative for malignant cells.  There was noted to be a polymorphous lymphoid population most consistent with benign reactive lymph node.  CT scan of the neck with contrast on 3/23/2021 revealed 2 separate enhancing masses in the area of the previous thyroid on the left with the largest measuring up to 1.6 cm and appeared consistent with residual thyroid tissue.  She does have a previous history of total thyroidectomy she believes at [facility].    She was set up to undergo an FNA but it was felt like a thyroid uptake scan would be more helpful.  We discussed things and she elected to pursue a follow-up ultrasound. Neck ultrasound on 9/15/2022 revealed a 2.1 x 1.7 x 0.7 cm hypoechoic neck mass which is relatively stable.  Thyroid function testing on 5/10/2022 revealed an elevated TSH of 18.800 but normal free T4 of 1.28.    She returns today for follow-up having last been seen on 9/28/2022.  She tells me that she has done well since her last appointment.  She has not had any issues with neck soreness, dysphagia, hoarseness, or neck stiffness.  Neck ultrasound on 9/25/2023 revealed bilateral benign-appearing lymph nodes and a 1.8 x 0.6 x 2.1 cm hypoechoic mass which is stable.  She does report intermittent bilateral mandibular pain in the area of her mandibular angles over the last few months.  This has not been associated with any trismus or lockjaw.  It has been occurring more frequently over the past few weeks.  She has not noticed any changes in her symptoms with mastication.  She is unsure if it is present first thing in the morning.  She does have a history of orthodontic treatment.  She denies any history of bruxism.  She does report some nasal dryness for which she has used a humidifier without improvement.  She does experience occasional rhinorrhea but mostly feels that her nose is quite dry.  She does report occasional nasal congestion.  Past Medical History:   Diagnosis  "Date    Back pain     ETD (Eustachian tube dysfunction), bilateral     Lymphadenopathy of head and neck     Multiple thyroid nodules 03/13/2017    Recurrent tonsillitis     Viral pharyngitis        Past Surgical History:   Procedure Laterality Date    EAR TUBES  2002    THYROID SURGERY  2017    \"Done in [place]\"         Current Outpatient Medications:     levonorgestrel-ethinyl estradiol (Vienva) 0.1-20 MG-MCG per tablet, Take 1 tablet by mouth Daily., Disp: 84 tablet, Rfl: 4    levothyroxine (SYNTHROID, LEVOTHROID) 125 MCG tablet, TAKE 1 TABLET BY MOUTH DAILY ON MONDAY THRU SATURDAY; AND TAKE 2 TABLETS ON SUNDAYS, Disp: 90 tablet, Rfl: 1    loratadine (CLARITIN) 10 MG tablet, Take 1 tablet by mouth Daily., Disp: , Rfl:     cyclobenzaprine (FLEXERIL) 10 MG tablet, Take 1 tablet by mouth every night at bedtime., Disp: 14 tablet, Rfl: 3    mupirocin (BACTROBAN) 2 % nasal ointment, 1 application  into the nostril(s) as directed by provider 2 (Two) Times a Day for 21 days. Apply to the nose twice daily, Disp: 22 g, Rfl: 0     No Known Allergies    Social History     Tobacco Use    Smoking status: Never    Smokeless tobacco: Never   Vaping Use    Vaping Use: Never used   Substance Use Topics    Alcohol use: Never    Drug use: Never        Objective     Vital Signs:   /88   Pulse 76   Temp 97.5 °F (36.4 °C)       Physical Exam    General: Well developed, well nourished patient of stated age in no acute distress. Voice is strong and clear.   Head: Normocephalic and atraumatic.  Face: No lesions.  Bilateral parotid and submandibular glands are unremarkable.  Stensen's and Warthin's ducts are productive of clear saliva bilaterally.  House-Brackmann I/VI     bilaterally.   muscles and temporomandibular joint nontender to palpation.  Bilateral TMJ crepitus.  Eyes: PERRLA, sclerae anicteric, no conjunctival injection. Extra ocular movements are intact and full. No nystagmus.   Ears: Auricles are normal in " appearance. Bilateral external auditory canals are unremarkable. Bilateral tympanic membranes are clear and without effusion. Hearing normal to conversational voice.   Nose: External nose is normal in appearance. Bilateral nares are patent with normal appearing mucosa. Septum midline. Turbinates are unremarkable. No lesions.   Oral Cavity: Lips are normal in appearance. Oral mucosa is unremarkable. Gingiva is unremarkable. Normal dentition for age. Tongue is unremarkable with good movement. Hard palate is unremarkable.   Oropharynx: Soft palate is unremarkable with full movement. Uvula is unremarkable. Bilateral tonsils are unremarkable. Posterior oropharynx is unremarkable.    Larynx and hypopharynx: Deferred secondary to gag reflex.  Neck: Supple.  Approximately 1.5 soft nontender mass overlying the left thyroid cartilage which is mobile. nontender to palpation.  Trachea midline. Thyroid surgically absent with a faint midline scar.   Lymphatic: No lymphadenopathy upon palpation.   Psychiatric: Appropriate affect, cooperative   Neurologic: Oriented x 3, strength symmetric in all extremities, Cranial Nerves II-XII are grossly intact to confrontation   Skin: Warm and dry. No rashes.    Procedures           Result Review :               Assessment and Plan    Diagnoses and all orders for this visit:    1. Neck mass (Primary)  -     US Head Neck Soft Tissue; Future    2. Jaw pain  -     cyclobenzaprine (FLEXERIL) 10 MG tablet; Take 1 tablet by mouth every night at bedtime.  Dispense: 14 tablet; Refill: 3    3. Nasal dryness  -     mupirocin (BACTROBAN) 2 % nasal ointment; 1 application  into the nostril(s) as directed by provider 2 (Two) Times a Day for 21 days. Apply to the nose twice daily  Dispense: 22 g; Refill: 0      Impressions and findings were discussed.  We reviewed and discussed the images from her recent ultrasound which revealed a stable 2.1 cm ovoid structure in the area of concern with a small amount of  vascular flow.  This has been relatively stable since 2018.  We again discussed that this is likely consistent with residual thyroid tissue.  Options for further evaluation management were discussed including doing nothing as it has been stable for at least 5 years.  Undergoing a repeat ultrasound in 5 years.  In regards to her mandibular pain and nasal dryness we discussed the differential.  I have asked her to observe her symptoms to see if they may be more noticeable in the morning or after eating.  We discussed conservative measures for TMJ/myofascial muscle pain she will also be sent in a course of cyclobenzaprine to take on an as-needed basis.  She will be placed on mupirocin for nasal dryness.  She was given ample time to ask questions, all of which were answered to her satisfaction.  Follow Up   Return in about 5 years (around 10/2/2028).  Patient was given instructions and counseling regarding her condition or for health maintenance advice. Please see specific information pulled into the AVS if appropriate.

## 2024-02-08 ENCOUNTER — OFFICE VISIT (OUTPATIENT)
Dept: FAMILY MEDICINE CLINIC | Facility: CLINIC | Age: 24
End: 2024-02-08
Payer: COMMERCIAL

## 2024-02-08 VITALS
BODY MASS INDEX: 26.31 KG/M2 | DIASTOLIC BLOOD PRESSURE: 64 MMHG | OXYGEN SATURATION: 98 % | SYSTOLIC BLOOD PRESSURE: 110 MMHG | TEMPERATURE: 98.6 F | WEIGHT: 143 LBS | HEART RATE: 88 BPM | HEIGHT: 62 IN

## 2024-02-08 DIAGNOSIS — Z23 NEED FOR IMMUNIZATION AGAINST INFLUENZA: Primary | ICD-10-CM

## 2024-02-08 DIAGNOSIS — F41.8 ANXIETY WITH DEPRESSION: ICD-10-CM

## 2024-02-08 DIAGNOSIS — E89.0 POSTOPERATIVE HYPOTHYROIDISM: ICD-10-CM

## 2024-02-08 LAB
ALBUMIN SERPL-MCNC: 5 G/DL (ref 3.5–5.2)
ALBUMIN/GLOB SERPL: 2.1 G/DL
ALP SERPL-CCNC: 85 U/L (ref 39–117)
ALT SERPL W P-5'-P-CCNC: 16 U/L (ref 1–33)
ANION GAP SERPL CALCULATED.3IONS-SCNC: 12 MMOL/L (ref 5–15)
AST SERPL-CCNC: 20 U/L (ref 1–32)
BILIRUB SERPL-MCNC: 0.6 MG/DL (ref 0–1.2)
BUN SERPL-MCNC: 8 MG/DL (ref 6–20)
BUN/CREAT SERPL: 12.7 (ref 7–25)
CALCIUM SPEC-SCNC: 9.9 MG/DL (ref 8.6–10.5)
CHLORIDE SERPL-SCNC: 103 MMOL/L (ref 98–107)
CO2 SERPL-SCNC: 21 MMOL/L (ref 22–29)
CREAT SERPL-MCNC: 0.63 MG/DL (ref 0.57–1)
EGFRCR SERPLBLD CKD-EPI 2021: 128.8 ML/MIN/1.73
GLOBULIN UR ELPH-MCNC: 2.4 GM/DL
GLUCOSE SERPL-MCNC: 83 MG/DL (ref 65–99)
POTASSIUM SERPL-SCNC: 3.9 MMOL/L (ref 3.5–5.2)
PROT SERPL-MCNC: 7.4 G/DL (ref 6–8.5)
SODIUM SERPL-SCNC: 136 MMOL/L (ref 136–145)
TSH SERPL DL<=0.05 MIU/L-ACNC: 0.4 UIU/ML (ref 0.27–4.2)

## 2024-02-08 PROCEDURE — 80053 COMPREHEN METABOLIC PANEL: CPT | Performed by: FAMILY MEDICINE

## 2024-02-08 PROCEDURE — 84443 ASSAY THYROID STIM HORMONE: CPT | Performed by: FAMILY MEDICINE

## 2024-02-08 RX ORDER — FLUOXETINE HYDROCHLORIDE 20 MG/1
20 CAPSULE ORAL DAILY
Qty: 30 CAPSULE | Refills: 2 | Status: SHIPPED | OUTPATIENT
Start: 2024-02-08 | End: 2024-03-09

## 2024-04-24 RX ORDER — LEVOTHYROXINE SODIUM 0.12 MG/1
TABLET ORAL
Qty: 90 TABLET | Refills: 1 | Status: SHIPPED | OUTPATIENT
Start: 2024-04-24

## 2024-05-24 ENCOUNTER — TELEPHONE (OUTPATIENT)
Dept: OBSTETRICS AND GYNECOLOGY | Facility: CLINIC | Age: 24
End: 2024-05-24

## 2024-05-24 NOTE — TELEPHONE ENCOUNTER
Caller: Fátima Cortes    Relationship to patient: Self    Best call back number: [phone] (home)       Chief complaint: WHITE MILKY DISCHARGE, SPOILED SMELLING ODOR    Type of visit: GYN FOLLOW UP     Requested date: ASAP     Additional notes: HUB UNABLE TO WT - DX IS URGENT AND REQUIRES APPT WITHIN 5 DAYS. NO AVAILABILITY WITH HUB, PLEASE CALL PT TO SCHEDULE. PT OK WITH SEEING OTHER PROVIDERS, OK WITH VOICE MAIL, OK WITH MYCHART.

## 2024-06-17 ENCOUNTER — OFFICE VISIT (OUTPATIENT)
Dept: OBSTETRICS AND GYNECOLOGY | Facility: CLINIC | Age: 24
End: 2024-06-17
Payer: COMMERCIAL

## 2024-06-17 VITALS
DIASTOLIC BLOOD PRESSURE: 67 MMHG | HEART RATE: 77 BPM | WEIGHT: 149 LBS | SYSTOLIC BLOOD PRESSURE: 110 MMHG | BODY MASS INDEX: 27.42 KG/M2 | HEIGHT: 62 IN

## 2024-06-17 DIAGNOSIS — Z30.41 ENCOUNTER FOR SURVEILLANCE OF CONTRACEPTIVE PILLS: ICD-10-CM

## 2024-06-17 DIAGNOSIS — N89.8 VAGINAL DISCHARGE: Primary | ICD-10-CM

## 2024-06-17 DIAGNOSIS — N89.8 VAGINAL ODOR: ICD-10-CM

## 2024-06-17 PROCEDURE — 99213 OFFICE O/P EST LOW 20 MIN: CPT | Performed by: OBSTETRICS & GYNECOLOGY

## 2024-06-17 RX ORDER — LEVONORGESTREL/ETHIN.ESTRADIOL 0.1-0.02MG
1 TABLET ORAL DAILY
Qty: 84 TABLET | Refills: 0 | Status: SHIPPED | OUTPATIENT
Start: 2024-06-17

## 2024-06-17 NOTE — PROGRESS NOTES
"GYN Problem/Follow Up Visit    Chief Complaint   Patient presents with    Vaginal Discharge           HPI  Fátima Cortes is a 24 y.o. female, , who presents for vaginal d/c and odor x 1 month. Denies itching. No new sex partners but does want the std swab. Has not tried anything otc. No hx of infections. Also requests bc refills until she can get back in for her wwe.        Additional OB/GYN History   Patient's last menstrual period was 2024 (approximate).  Current contraception: contraceptive methods: OCP (estrogen/progesterone)  Desires to: continue contraception  Allergies : Patient has no known allergies.     The additional following portions of the patient's history were reviewed and updated as appropriate: allergies, current medications, past family history, past medical history, past social history, past surgical history, and problem list.    Review of Systems    I have reviewed and agree with the HPI, ROS, and historical information as entered above. Lizette Parham, APRN    Objective   /67   Pulse 77   Ht 157.5 cm (62\")   Wt 67.6 kg (149 lb)   LMP 2024 (Approximate)   BMI 27.25 kg/m²     Physical Exam  Vitals reviewed.   Genitourinary:     General: Normal vulva.      Vagina: No signs of injury and foreign body. Vaginal discharge (thin white) present. No erythema, tenderness, bleeding, lesions or prolapsed vaginal walls.      Cervix: Discharge present. No cervical motion tenderness, friability, lesion, erythema or cervical bleeding.   Skin:     General: Skin is warm and dry.   Neurological:      Mental Status: She is alert and oriented to person, place, and time.            Assessment and Plan    Diagnoses and all orders for this visit:    1. Vaginal discharge (Primary)  -     NuSwab VG+ - Swab, Vagina    2. Vaginal odor  -     NuSwab VG+ - Swab, Vagina    3. Encounter for surveillance of contraceptive pills  -     levonorgestrel-ethinyl estradiol (Vienva) 0.1-20 MG-MCG per " tablet; Take 1 tablet by mouth Daily.  Dispense: 84 tablet; Refill: 0    Will check for infections. Can try boric acid vaginal suppositories nightly x 1 week. Bc refills sent. F/u at [facility], sooner for any concerns.     Counseling:  She understands the importance of having the above orders performed in a timely fashion.  She is encouraged to review her results online and/or contact or office if she has questions.     Follow Up:  Return for Annual physical.      LEILA Sheth  06/17/2024

## 2024-06-19 LAB
A VAGINAE DNA VAG QL NAA+PROBE: NORMAL SCORE
BVAB2 DNA VAG QL NAA+PROBE: NORMAL SCORE
C ALBICANS DNA VAG QL NAA+PROBE: NEGATIVE
C GLABRATA DNA VAG QL NAA+PROBE: NEGATIVE
C TRACH DNA VAG QL NAA+PROBE: NEGATIVE
MEGA1 DNA VAG QL NAA+PROBE: NORMAL SCORE
N GONORRHOEA DNA VAG QL NAA+PROBE: NEGATIVE
T VAGINALIS DNA VAG QL NAA+PROBE: NEGATIVE

## 2024-07-25 ENCOUNTER — OFFICE VISIT (OUTPATIENT)
Dept: OBSTETRICS AND GYNECOLOGY | Facility: CLINIC | Age: 24
End: 2024-07-25
Payer: COMMERCIAL

## 2024-07-25 VITALS
WEIGHT: 144 LBS | HEART RATE: 77 BPM | DIASTOLIC BLOOD PRESSURE: 77 MMHG | SYSTOLIC BLOOD PRESSURE: 112 MMHG | BODY MASS INDEX: 26.5 KG/M2 | HEIGHT: 62 IN

## 2024-07-25 DIAGNOSIS — Z01.419 ENCOUNTER FOR GYNECOLOGICAL EXAMINATION WITHOUT ABNORMAL FINDING: Primary | ICD-10-CM

## 2024-07-25 DIAGNOSIS — Z30.41 ENCOUNTER FOR SURVEILLANCE OF CONTRACEPTIVE PILLS: ICD-10-CM

## 2024-07-25 PROCEDURE — G0123 SCREEN CERV/VAG THIN LAYER: HCPCS | Performed by: OBSTETRICS & GYNECOLOGY

## 2024-07-25 RX ORDER — LEVONORGESTREL/ETHIN.ESTRADIOL 0.1-0.02MG
1 TABLET ORAL DAILY
Qty: 84 TABLET | Refills: 4 | Status: SHIPPED | OUTPATIENT
Start: 2024-07-25

## 2024-07-25 NOTE — PROGRESS NOTES
"Well Woman Visit    CC: Annual well woman exam       HPI:   24 y.o. Contraception or HRT: Contraception:  Birth control pill  Menses:   q mon, lasts 4 days, no heavy days   Pain:  None  Incontinence concerns: No  Hx of abnormal pap:  No  Pt has no complaints today.      History: PMHx, Meds, Allergies, PSHx, Social Hx, and POBHx all reviewed and updated.      PHYSICAL EXAM:  /77   Pulse 77   Ht 157.5 cm (62\")   Wt 65.3 kg (144 lb)   LMP 07/15/2024 (Approximate)   BMI 26.34 kg/m²   General- NAD, alert and oriented, appropriate  Psych- Normal mood, good memory  Neck- No masses, no thyroid enlargement  CV- Regular rhythm, no murnurs  Resp- CTA to bases, no wheezes  Abdomen- Soft, non distended, non tender, no masses    Breast left-  Bilaterally symmetrical, no masses, non tender, no nipple discharge  Breast right- Bilaterally symmetrical, no masses, non tender, no nipple discharge    External genitalia- Normal female, no lesions  Urethra/meatus- Normal, no masses, non tender, no prolapse  Bladder- Normal, no masses, non tender, no prolapse  Vagina- Normal, no atrophy, no lesions, no discharge, no prolapse  Cvx- Normal, no lesions, no discharge, No cervical motion tenderness  Uterus- Normal size, shape & consistency.  Non tender, mobile.  Adnexa- No mass, non tender  Anus/Rectum/Perineum- Not performed    Lymphatic- No palpable neck, axillary, or groin nodes  Ext- No edema, no cyanosis    Skin- No lesions, no rashes, no acanthosis nigricans        ASSESSMENT and PLAN:  WWE and Contraception    Diagnoses and all orders for this visit:    1. Encounter for gynecological examination without abnormal finding (Primary)  -     IGP,rfx Aptima HPV All Pth    2. Encounter for surveillance of contraceptive pills  -     levonorgestrel-ethinyl estradiol (Vienva) 0.1-20 MG-MCG per tablet; Take 1 tablet by mouth Daily.  Dispense: 84 tablet; Refill: 4    Happy with current bcp and desires to continue. Desires to " continue with yearly paps for now.    Counseling:     OCP/Hormone use risk LANRE  Track menses, RTO IF <q21d, >7d long, or heavy    Domestic violence/abuse screen: negative    Depression screen: no SI    Preventative:   BREAST HEALTH- Monthly self breast exam importance and how to reviewed. MMG and/or MRI (prn) reviewed per society guidelines and her individual history. Mammo/MRI screen: Not medically needed.  CERVICAL CANCER Screening- Reviewed current ASCCP guidelines for screening w and wo cotest HPV, age specific.  Screen: Updated today.  COLON CANCER Screening- Reviewed current medical society guidelines and options.  Colonoscopy screen:  Not medically needed.  SEXUAL HEALTH: Declines STD screening.  VACCINATIONS Recommended: Flu annually, Gardisil/HPV vaccine 9-27yo, up to 46yo.  Importance discussed, risk being unvaccinated reviewed.  Questions answered  Smoking status- NON SMOKER.  Importance of avoiding second hand smoke.  Myriad : does not qualify.  Gardasil status: completed.      She understands the importance of having any ordered tests to be performed in a timely fashion.  She is encouraged to review her results online and/or contact or office if she has questions.     Follow Up:  Return in about 1 year (around 7/25/2025) for Annual physical.      Lizette Parham, LEILA  07/25/2024

## 2024-07-31 LAB
CONV .: NORMAL
CYTOLOGIST CVX/VAG CYTO: NORMAL
CYTOLOGY CVX/VAG DOC CYTO: NORMAL
CYTOLOGY CVX/VAG DOC THIN PREP: NORMAL
DX ICD CODE: NORMAL
Lab: NORMAL
OTHER STN SPEC: NORMAL
STAT OF ADQ CVX/VAG CYTO-IMP: NORMAL

## 2024-08-28 DIAGNOSIS — Z30.41 ENCOUNTER FOR SURVEILLANCE OF CONTRACEPTIVE PILLS: ICD-10-CM

## 2024-08-28 RX ORDER — TIMOLOL MALEATE 5 MG/ML
1 SOLUTION/ DROPS OPHTHALMIC DAILY
Qty: 84 TABLET | Refills: 4 | OUTPATIENT
Start: 2024-08-28

## 2024-08-29 ENCOUNTER — OFFICE VISIT (OUTPATIENT)
Dept: FAMILY MEDICINE CLINIC | Facility: CLINIC | Age: 24
End: 2024-08-29
Payer: COMMERCIAL

## 2024-08-29 VITALS
TEMPERATURE: 97.7 F | HEIGHT: 62 IN | WEIGHT: 135 LBS | OXYGEN SATURATION: 99 % | BODY MASS INDEX: 24.84 KG/M2 | HEART RATE: 96 BPM

## 2024-08-29 DIAGNOSIS — R10.13 EPIGASTRIC PAIN: ICD-10-CM

## 2024-08-29 DIAGNOSIS — R11.2 NAUSEA AND VOMITING, UNSPECIFIED VOMITING TYPE: ICD-10-CM

## 2024-08-29 DIAGNOSIS — R19.7 DIARRHEA, UNSPECIFIED TYPE: Primary | ICD-10-CM

## 2024-08-29 PROCEDURE — 80053 COMPREHEN METABOLIC PANEL: CPT | Performed by: STUDENT IN AN ORGANIZED HEALTH CARE EDUCATION/TRAINING PROGRAM

## 2024-08-29 PROCEDURE — 85025 COMPLETE CBC W/AUTO DIFF WBC: CPT | Performed by: STUDENT IN AN ORGANIZED HEALTH CARE EDUCATION/TRAINING PROGRAM

## 2024-08-29 PROCEDURE — 99214 OFFICE O/P EST MOD 30 MIN: CPT | Performed by: STUDENT IN AN ORGANIZED HEALTH CARE EDUCATION/TRAINING PROGRAM

## 2024-08-29 RX ORDER — DICYCLOMINE HCL 20 MG
TABLET ORAL
COMMUNITY
Start: 2024-08-25 | End: 2024-08-29

## 2024-08-29 RX ORDER — ONDANSETRON 4 MG/1
TABLET, ORALLY DISINTEGRATING ORAL
COMMUNITY
Start: 2024-08-25 | End: 2024-08-29

## 2024-08-29 RX ORDER — METOCLOPRAMIDE 5 MG/1
5 TABLET ORAL
Qty: 28 TABLET | Refills: 0 | Status: SHIPPED | OUTPATIENT
Start: 2024-08-29 | End: 2024-09-05

## 2024-08-29 NOTE — PROGRESS NOTES
[facility]  Diarrhea and Vomiting    Subjective      History of Present Illness    Fátima Cortes is a 24 y.o. female who presents to [facility]   Presents for ER follow-up she was seen at [facility] in [facility] for nausea vomiting diarrhea she had a CT abdomen pelvis which was normal I do not have access to these I obtained history from the patient.  She denies any abdominal pain currently but she states she has abdominal pain current during diarrheal episodes.  She states she was playing with pain decals a couple weeks ago she is worried that this is the cause of this.      Objective   Vital Signs:   Vitals:    08/29/24 1458   Pulse: 96   Temp: 97.7 °F (36.5 °C)   SpO2: 99%   Weight: 61.2 kg (135 lb)   Height: 157.5 cm (62\")     Body mass index is 24.69 kg/m².    Wt Readings from Last 3 Encounters:   08/29/24 61.2 kg (135 lb)   07/25/24 65.3 kg (144 lb)   06/17/24 67.6 kg (149 lb)     BP Readings from Last 3 Encounters:   07/25/24 112/77   06/17/24 110/67   02/08/24 110/64       Health Maintenance   Topic Date Due    HPV VACCINES (3 - 2-dose series) 08/04/2015    ANNUAL PHYSICAL  Never done    COVID-19 Vaccine (1 - 2023-24 season) Never done    TDAP/TD VACCINES (2 - Td or Tdap) 08/27/2024    INFLUENZA VACCINE  08/01/2024    CHLAMYDIA SCREENING  06/17/2025    PAP SMEAR  07/25/2027    HEPATITIS C SCREENING  Completed    Pneumococcal Vaccine 0-64  Aged Out       Physical Exam   General:  AAO x3, no acute distress.  Eyes:  EOMI, PERRLA  Ears/Nose/Mouth/Throat:  Moist mucous membranes  Respiratory:  CTA bilaterally, no wheezes rales or rhonchi  Cardiovascular:  RRR no murmur rubs or gallops  Gastrointestinal: No rebound or rigidity noted abdomen soft nondistended nontender bowel sounds present x4 quadrants  Musculoskeletal:  Moves all 4 extremities spontaneously, no apparent weakness  Skin:  Warm, dry, no skin rashes or lesions  Neurologic:  AAO x3, " cranial nerves 2-12 grossly intact, no focal neuro deficits  Psychiatric:  Normal mood and affect    Result Review :     The following data was reviewed by: Kristopher Monique MD on 08/29/2024:      Procedures          Diagnoses and all orders for this visit:    1. Diarrhea, unspecified type (Primary)  -     Comprehensive Metabolic Panel  -     CBC & Differential    2. Nausea and vomiting, unspecified vomiting type  -     CT Abdomen Pelvis Without Contrast; Future  -     metoclopramide (Reglan) 5 MG tablet; Take 1 tablet by mouth 4 (Four) Times a Day Before Meals & at Bedtime for 7 days.  Dispense: 28 tablet; Refill: 0    3. Epigastric pain  -     CT Abdomen Pelvis Without Contrast; Future    [place] diet, increase amount of fluids intake, and order labs today repeat CT abdomen pelvis ordered.    BMI is within normal parameters. No other follow-up for BMI required.         FOLLOW UP  Return in about 4 weeks (around 9/26/2024).  Patient was given instructions and counseling regarding her condition or for health maintenance advice. Please see specific information pulled into the AVS if appropriate.       Kristopher Monique MD  08/29/24  15:50 EDT    CURRENT & DISCONTINUED MEDICATIONS  Current Outpatient Medications   Medication Instructions    levonorgestrel-ethinyl estradiol (Vienva) 0.1-20 MG-MCG per tablet 1 tablet, Oral, Daily    levothyroxine (SYNTHROID, LEVOTHROID) 125 MCG tablet TAKE 1 TABLET BY MOUTH MONDAY THRU SATURDAY; TAKE 2 TABLETS ON SUNDAYS    metoclopramide (REGLAN) 5 mg, Oral, 4 Times Daily Before Meals & Nightly       Medications Discontinued During This Encounter   Medication Reason    FLUoxetine (PROzac) 20 MG capsule     ondansetron ODT (ZOFRAN-ODT) 4 MG disintegrating tablet     loratadine (CLARITIN) 10 MG tablet     cyclobenzaprine (FLEXERIL) 10 MG tablet     dicyclomine (BENTYL) 20 MG tablet

## 2024-08-30 LAB
ALBUMIN SERPL-MCNC: 4.1 G/DL (ref 3.5–5.2)
ALBUMIN/GLOB SERPL: 1.4 G/DL
ALP SERPL-CCNC: 78 U/L (ref 39–117)
ALT SERPL W P-5'-P-CCNC: 54 U/L (ref 1–33)
ANION GAP SERPL CALCULATED.3IONS-SCNC: 15.3 MMOL/L (ref 5–15)
AST SERPL-CCNC: 60 U/L (ref 1–32)
BASOPHILS # BLD AUTO: 0.06 10*3/MM3 (ref 0–0.2)
BASOPHILS NFR BLD AUTO: 0.8 % (ref 0–1.5)
BILIRUB SERPL-MCNC: 0.3 MG/DL (ref 0–1.2)
BUN SERPL-MCNC: 6 MG/DL (ref 6–20)
BUN/CREAT SERPL: 8.1 (ref 7–25)
CALCIUM SPEC-SCNC: 9.8 MG/DL (ref 8.6–10.5)
CHLORIDE SERPL-SCNC: 98 MMOL/L (ref 98–107)
CO2 SERPL-SCNC: 22.7 MMOL/L (ref 22–29)
CREAT SERPL-MCNC: 0.74 MG/DL (ref 0.57–1)
DEPRECATED RDW RBC AUTO: 38.4 FL (ref 37–54)
EGFRCR SERPLBLD CKD-EPI 2021: 116.8 ML/MIN/1.73
EOSINOPHIL # BLD AUTO: 0.07 10*3/MM3 (ref 0–0.4)
EOSINOPHIL NFR BLD AUTO: 0.9 % (ref 0.3–6.2)
ERYTHROCYTE [DISTWIDTH] IN BLOOD BY AUTOMATED COUNT: 12.5 % (ref 12.3–15.4)
GLOBULIN UR ELPH-MCNC: 2.9 GM/DL
GLUCOSE SERPL-MCNC: 69 MG/DL (ref 65–99)
HCT VFR BLD AUTO: 42.6 % (ref 34–46.6)
HGB BLD-MCNC: 14 G/DL (ref 12–15.9)
IMM GRANULOCYTES # BLD AUTO: 0.03 10*3/MM3 (ref 0–0.05)
IMM GRANULOCYTES NFR BLD AUTO: 0.4 % (ref 0–0.5)
LYMPHOCYTES # BLD AUTO: 2.66 10*3/MM3 (ref 0.7–3.1)
LYMPHOCYTES NFR BLD AUTO: 34.1 % (ref 19.6–45.3)
MCH RBC QN AUTO: 27.9 PG (ref 26.6–33)
MCHC RBC AUTO-ENTMCNC: 32.9 G/DL (ref 31.5–35.7)
MCV RBC AUTO: 85 FL (ref 79–97)
MONOCYTES # BLD AUTO: 0.9 10*3/MM3 (ref 0.1–0.9)
MONOCYTES NFR BLD AUTO: 11.6 % (ref 5–12)
NEUTROPHILS NFR BLD AUTO: 4.07 10*3/MM3 (ref 1.7–7)
NEUTROPHILS NFR BLD AUTO: 52.2 % (ref 42.7–76)
NRBC BLD AUTO-RTO: 0 /100 WBC (ref 0–0.2)
PLATELET # BLD AUTO: 386 10*3/MM3 (ref 140–450)
PMV BLD AUTO: 11.1 FL (ref 6–12)
POTASSIUM SERPL-SCNC: 3.4 MMOL/L (ref 3.5–5.2)
PROT SERPL-MCNC: 7 G/DL (ref 6–8.5)
RBC # BLD AUTO: 5.01 10*6/MM3 (ref 3.77–5.28)
SODIUM SERPL-SCNC: 136 MMOL/L (ref 136–145)
WBC NRBC COR # BLD AUTO: 7.79 10*3/MM3 (ref 3.4–10.8)

## 2024-10-09 RX ORDER — LEVOTHYROXINE SODIUM 125 UG/1
TABLET ORAL
Qty: 90 TABLET | Refills: 1 | Status: SHIPPED | OUTPATIENT
Start: 2024-10-09

## 2024-10-09 NOTE — TELEPHONE ENCOUNTER
Rx Refill Note  Requested Prescriptions     Pending Prescriptions Disp Refills    levothyroxine (SYNTHROID, LEVOTHROID) 125 MCG tablet 90 tablet 1     Sig: TAKE 1 TABLET BY MOUTH MONDAY THRU SATURDAY; TAKE 2 TABLETS ON SUNDAYS      Last office visit with prescribing clinician: 2/8/2024   Last telemedicine visit with prescribing clinician: Visit date not found   Next office visit with prescribing clinician: Visit date not found                         Would you like a call back once the refill request has been completed: [] Yes [] No    If the office needs to give you a call back, can they leave a voicemail: [] Yes [] No    Inga Watters Rep  10/09/24, 08:52 EDT

## 2025-02-04 ENCOUNTER — OFFICE VISIT (OUTPATIENT)
Dept: FAMILY MEDICINE CLINIC | Facility: CLINIC | Age: 25
End: 2025-02-04
Payer: COMMERCIAL

## 2025-02-04 VITALS
TEMPERATURE: 98.4 F | BODY MASS INDEX: 24.66 KG/M2 | SYSTOLIC BLOOD PRESSURE: 118 MMHG | HEIGHT: 62 IN | HEART RATE: 72 BPM | DIASTOLIC BLOOD PRESSURE: 64 MMHG | OXYGEN SATURATION: 97 % | WEIGHT: 134 LBS

## 2025-02-04 DIAGNOSIS — E89.0 POSTOPERATIVE HYPOTHYROIDISM: ICD-10-CM

## 2025-02-04 DIAGNOSIS — F51.5 NIGHTMARES: ICD-10-CM

## 2025-02-04 DIAGNOSIS — Z00.00 ANNUAL PHYSICAL EXAM: ICD-10-CM

## 2025-02-04 DIAGNOSIS — F41.9 ANXIETY: ICD-10-CM

## 2025-02-04 DIAGNOSIS — E04.2 MULTIPLE THYROID NODULES: Primary | ICD-10-CM

## 2025-02-04 LAB
ALBUMIN SERPL-MCNC: 4.4 G/DL (ref 3.5–5.2)
ALBUMIN/GLOB SERPL: 1.5 G/DL
ALP SERPL-CCNC: 86 U/L (ref 39–117)
ALT SERPL W P-5'-P-CCNC: 17 U/L (ref 1–33)
ANION GAP SERPL CALCULATED.3IONS-SCNC: 12 MMOL/L (ref 5–15)
AST SERPL-CCNC: 26 U/L (ref 1–32)
BASOPHILS # BLD AUTO: 0.04 10*3/MM3 (ref 0–0.2)
BASOPHILS NFR BLD AUTO: 0.3 % (ref 0–1.5)
BILIRUB SERPL-MCNC: 0.6 MG/DL (ref 0–1.2)
BUN SERPL-MCNC: 8 MG/DL (ref 6–20)
BUN/CREAT SERPL: 12.1 (ref 7–25)
CALCIUM SPEC-SCNC: 9.7 MG/DL (ref 8.6–10.5)
CHLORIDE SERPL-SCNC: 106 MMOL/L (ref 98–107)
CO2 SERPL-SCNC: 21 MMOL/L (ref 22–29)
CREAT SERPL-MCNC: 0.66 MG/DL (ref 0.57–1)
DEPRECATED RDW RBC AUTO: 39.7 FL (ref 37–54)
EGFRCR SERPLBLD CKD-EPI 2021: 126.6 ML/MIN/1.73
EOSINOPHIL # BLD AUTO: 0.03 10*3/MM3 (ref 0–0.4)
EOSINOPHIL NFR BLD AUTO: 0.3 % (ref 0.3–6.2)
ERYTHROCYTE [DISTWIDTH] IN BLOOD BY AUTOMATED COUNT: 12.5 % (ref 12.3–15.4)
GLOBULIN UR ELPH-MCNC: 2.9 GM/DL
GLUCOSE SERPL-MCNC: 82 MG/DL (ref 65–99)
HCT VFR BLD AUTO: 37.6 % (ref 34–46.6)
HGB BLD-MCNC: 12.6 G/DL (ref 12–15.9)
IMM GRANULOCYTES # BLD AUTO: 0.05 10*3/MM3 (ref 0–0.05)
IMM GRANULOCYTES NFR BLD AUTO: 0.4 % (ref 0–0.5)
LYMPHOCYTES # BLD AUTO: 3.25 10*3/MM3 (ref 0.7–3.1)
LYMPHOCYTES NFR BLD AUTO: 28.3 % (ref 19.6–45.3)
MCH RBC QN AUTO: 29.5 PG (ref 26.6–33)
MCHC RBC AUTO-ENTMCNC: 33.5 G/DL (ref 31.5–35.7)
MCV RBC AUTO: 88.1 FL (ref 79–97)
MONOCYTES # BLD AUTO: 0.59 10*3/MM3 (ref 0.1–0.9)
MONOCYTES NFR BLD AUTO: 5.1 % (ref 5–12)
NEUTROPHILS NFR BLD AUTO: 65.6 % (ref 42.7–76)
NEUTROPHILS NFR BLD AUTO: 7.53 10*3/MM3 (ref 1.7–7)
NRBC BLD AUTO-RTO: 0 /100 WBC (ref 0–0.2)
PLATELET # BLD AUTO: 357 10*3/MM3 (ref 140–450)
PMV BLD AUTO: 11.8 FL (ref 6–12)
POTASSIUM SERPL-SCNC: 3.8 MMOL/L (ref 3.5–5.2)
PROT SERPL-MCNC: 7.3 G/DL (ref 6–8.5)
RBC # BLD AUTO: 4.27 10*6/MM3 (ref 3.77–5.28)
SODIUM SERPL-SCNC: 139 MMOL/L (ref 136–145)
T3FREE SERPL-MCNC: 4.31 PG/ML (ref 2–4.4)
T4 FREE SERPL-MCNC: 2.04 NG/DL (ref 0.92–1.68)
TSH SERPL DL<=0.05 MIU/L-ACNC: 0.01 UIU/ML (ref 0.27–4.2)
WBC NRBC COR # BLD AUTO: 11.49 10*3/MM3 (ref 3.4–10.8)

## 2025-02-04 PROCEDURE — 99214 OFFICE O/P EST MOD 30 MIN: CPT | Performed by: FAMILY MEDICINE

## 2025-02-04 PROCEDURE — 84481 FREE ASSAY (FT-3): CPT | Performed by: FAMILY MEDICINE

## 2025-02-04 PROCEDURE — 80050 GENERAL HEALTH PANEL: CPT | Performed by: FAMILY MEDICINE

## 2025-02-04 PROCEDURE — 84439 ASSAY OF FREE THYROXINE: CPT | Performed by: FAMILY MEDICINE

## 2025-02-04 RX ORDER — PRAZOSIN HYDROCHLORIDE 1 MG/1
1 CAPSULE ORAL NIGHTLY
Qty: 30 CAPSULE | Refills: 2 | Status: SHIPPED | OUTPATIENT
Start: 2025-02-04 | End: 2025-03-06

## 2025-02-04 RX ORDER — FLUTICASONE PROPIONATE 50 MCG
1 SPRAY, SUSPENSION (ML) NASAL DAILY
COMMUNITY
Start: 2024-12-26

## 2025-02-04 RX ORDER — BUSPIRONE HYDROCHLORIDE 5 MG/1
5 TABLET ORAL 3 TIMES DAILY
Qty: 90 TABLET | Refills: 0 | Status: SHIPPED | OUTPATIENT
Start: 2025-02-04 | End: 2025-03-06

## 2025-02-04 RX ORDER — OSELTAMIVIR PHOSPHATE 75 MG/1
1 CAPSULE ORAL EVERY 12 HOURS SCHEDULED
COMMUNITY
Start: 2024-12-26 | End: 2025-02-04

## 2025-02-04 NOTE — PROGRESS NOTES
Chief Complaint  Annual Exam, Would like my thyroid levels checked, and FU on fluoxetine (I still have anxiety.  I am not depressed, just have anxiety)    Subjective        Fátima Cortes presents to [facility]  History of Present Illness  The patient presents for evaluation of anxiety, nightmares, and thyroid management.    She reports a lack of significant improvement in her symptoms with fluoxetine 20 mg, despite not feeling depressed. She continues to experience anxiety, particularly when alone or during sleep, often waking up due to vivid dreams. She resides with her boyfriend and is rarely home alone. When she is alone, she engages in activities such as cleaning to manage her anxiety. She is able to fall asleep easily and goes to bed early but experiences persistent fatigue and frequent awakenings at night. She has been under the care of Dr. Diallo for her thyroid condition.    She also reports recurrent nightmares, including a recent episode where she dreamt of being kidnapped, resulting in profuse sweating and necessitating a change of clothes. She occasionally reads horror literature but does not frequently watch horror films.    She has a history of thyroid nodules, which have been surgically removed, and was advised to return for follow-up in 5 years due to the absence of nodule growth.    Supplemental Information  She underwent an emergency cholecystectomy last year due to gallbladder sludge, which was identified following an episode of vomiting after eating. She reports no abdominal pain associated with this event. Her bowel movements have since returned to normal, and she is able to eat without difficulty.    MEDICATIONS  Current: Fluoxetine        Medical History: has a past medical history of Back pain, ETD (Eustachian tube dysfunction), bilateral, Lymphadenopathy of head and neck, Multiple thyroid nodules (03/13/2017), Recurrent tonsillitis, and Viral pharyngitis.  "  Surgical History: has a past surgical history that includes Ear Tubes Removal (2002) and Thyroid surgery (2017).   Family History: family history includes Lung cancer in an other family member; Thyroid cancer in an other family member.   Social History: reports that she has never smoked. She has never been exposed to tobacco smoke. She has never used smokeless tobacco. She reports that she does not drink alcohol and does not use drugs.  Immunization History   Administered Date(s) Administered    Flu Vaccine Intradermal Quad 18-64YR 02/04/2015    HPV Quadrivalent 02/04/2015, 03/17/2015    Hep A, 2 Dose 08/27/2014, 03/17/2015    Influenza Seasonal Injectable 02/04/2015    MCV4 Unspecified 08/27/2014    Meningococcal B,(Bexsero) 11/04/2019    Meningococcal MCV4P (Menactra) 08/27/2014    Tdap 08/27/2014    Varicella 08/27/2014       Objective   Vital Signs:  /64   Pulse 72   Temp 98.4 °F (36.9 °C)   Ht 157.5 cm (62\")   Wt 60.8 kg (134 lb)   SpO2 97%   BMI 24.51 kg/m²   Estimated body mass index is 24.51 kg/m² as calculated from the following:    Height as of this encounter: 157.5 cm (62\").    Weight as of this encounter: 60.8 kg (134 lb).     BMI is within normal parameters. No other follow-up for BMI required.      ROS:  Review of Systems   Respiratory:  Positive for shortness of breath.    Cardiovascular:  Negative for chest pain and palpitations.   Gastrointestinal:  Positive for nausea.   Neurological:  Negative for dizziness and confusion.   Psychiatric/Behavioral:  Positive for depressed mood.       Physical Exam  Vitals reviewed.   Constitutional:       Appearance: Normal appearance.   HENT:      Right Ear: Tympanic membrane normal.      Left Ear: Tympanic membrane normal.      Nose: Nose normal.   Eyes:      Extraocular Movements: Extraocular movements intact.      Conjunctiva/sclera: Conjunctivae normal.      Pupils: Pupils are equal, round, and reactive to light.   Cardiovascular:      Rate and " Rhythm: Normal rate and regular rhythm.   Pulmonary:      Effort: Pulmonary effort is normal.      Breath sounds: Normal breath sounds.   Abdominal:      General: Bowel sounds are normal.   Musculoskeletal:         General: Normal range of motion.      Cervical back: Normal range of motion.   Skin:     General: Skin is warm and dry.   Neurological:      General: No focal deficit present.      Mental Status: She is alert and oriented to person, place, and time.   Psychiatric:         Mood and Affect: Mood normal.         Behavior: Behavior normal.       Physical Exam  Thyroid was examined.  Lungs were auscultated.  Heart sounds are normal.      Result Review     The following data was reviewed by: Daily Marley MD on 02/04/2025:  Common labs          8/29/2024    15:49 2/4/2025    15:36   Common Labs   Glucose 69  82    BUN 6  8    Creatinine 0.74  0.66    Sodium 136  139    Potassium 3.4  3.8    Chloride 98  106    Calcium 9.8  9.7    Albumin 4.1  4.4    Total Bilirubin 0.3  0.6    Alkaline Phosphatase 78  86    AST (SGOT) 60  26    ALT (SGPT) 54  17    WBC 7.79  11.49    Hemoglobin 14.0  12.6    Hematocrit 42.6  37.6    Platelets 386  357      Results               Assessment and Plan    Diagnoses and all orders for this visit:    1. Multiple thyroid nodules (Primary)  -     Ambulatory Referral to Endocrinology    2. Postoperative hypothyroidism  -     Ambulatory Referral to Endocrinology  -     TSH  -     T4, Free  -     T3, Free  -     Comprehensive Metabolic Panel  -     CBC Auto Differential    3. Nightmares  -     prazosin (MINIPRESS) 1 MG capsule; Take 1 capsule by mouth Every Night for 30 days.  Dispense: 30 capsule; Refill: 2    4. Anxiety  -     busPIRone (BUSPAR) 5 MG tablet; Take 1 tablet by mouth 3 (Three) Times a Day for 30 days.  Dispense: 90 tablet; Refill: 0    5. Annual physical exam      Assessment & Plan  1. Anxiety.  Her anxiety may be exacerbated by her thyroid medication, necessitating a  consultation with an endocrinologist for potential adjustment of her thyroid medication dosage. She will discontinue fluoxetine 20 mg and start buspirone 5 mg, to be taken 1 to 3 times daily as needed. A referral to an endocrinologist has been made for further evaluation and management of her thyroid condition.    2. Nightmares.  A prescription for prazosin 1 mg has been provided to manage her nightmares. She is advised to take it before bedtime.    3. Thyroid management.  Her last TSH level was within the normal range but on the lower end, indicating a potential need for adjustment of her thyroid medication. A comprehensive thyroid panel, including TSH, T4, and T3, will be ordered to assess her current thyroid function. Additionally, kidney and liver function tests will be conducted.    Follow-up  The patient will follow up in 3 months.    PROCEDURE  The patient underwent an emergency cholecystectomy last year due to gallbladder sludge.       I spent 35 minutes caring for Fátima on this date of service. This time includes time spent by me in the following activities:reviewing tests  Follow Up   Return in about 3 months (around 5/4/2025).  Patient was given instructions and counseling regarding her condition or for health maintenance advice. Please see specific information pulled into the AVS if appropriate.   Patient or patient representative verbalized consent for the use of Ambient Listening during the visit with  Daily Marley MD for chart documentation. 2/5/2025  13:52 EST    Daily Marley MD      Answers submitted by the patient for this visit:  Anxiety (Submitted on 1/28/2025)  Chief Complaint: Anxiety  Visit: follow-up  Frequency: most days  Severity: mild  dry mouth: No  excessive worry: Yes  insomnia: Yes  irritability: Yes  malaise/fatigue: No  obsessions: No  Sleep quality: fair  Hours of sleep per night: 7 Hours  Aggravated by: social activities, work stress  Medication compliance: %

## 2025-02-27 NOTE — TELEPHONE ENCOUNTER
PT stated in call that she was no longer taking this medication and was put on something different.    Called preferred pharmacy to let them know not to refill it.

## 2025-03-03 DIAGNOSIS — F41.9 ANXIETY: ICD-10-CM

## 2025-03-03 RX ORDER — BUSPIRONE HYDROCHLORIDE 5 MG/1
5 TABLET ORAL 3 TIMES DAILY
Qty: 90 TABLET | Refills: 0 | Status: SHIPPED | OUTPATIENT
Start: 2025-03-03

## 2025-04-06 DIAGNOSIS — F41.9 ANXIETY: ICD-10-CM

## 2025-04-07 RX ORDER — BUSPIRONE HYDROCHLORIDE 5 MG/1
5 TABLET ORAL 3 TIMES DAILY
Qty: 90 TABLET | Refills: 0 | Status: SHIPPED | OUTPATIENT
Start: 2025-04-07

## 2025-04-11 RX ORDER — LEVOTHYROXINE SODIUM 125 UG/1
TABLET ORAL
Qty: 90 TABLET | Refills: 1 | Status: SHIPPED | OUTPATIENT
Start: 2025-04-11

## 2025-05-05 ENCOUNTER — OFFICE VISIT (OUTPATIENT)
Dept: FAMILY MEDICINE CLINIC | Facility: CLINIC | Age: 25
End: 2025-05-05
Payer: COMMERCIAL

## 2025-05-05 VITALS
HEIGHT: 62 IN | TEMPERATURE: 98.3 F | OXYGEN SATURATION: 99 % | WEIGHT: 140.4 LBS | HEART RATE: 71 BPM | BODY MASS INDEX: 25.83 KG/M2 | SYSTOLIC BLOOD PRESSURE: 118 MMHG | DIASTOLIC BLOOD PRESSURE: 76 MMHG

## 2025-05-05 DIAGNOSIS — F41.9 ANXIETY: Primary | ICD-10-CM

## 2025-05-05 DIAGNOSIS — G43.909 MIGRAINE WITHOUT STATUS MIGRAINOSUS, NOT INTRACTABLE, UNSPECIFIED MIGRAINE TYPE: ICD-10-CM

## 2025-05-05 DIAGNOSIS — J30.1 SEASONAL ALLERGIC RHINITIS DUE TO POLLEN: ICD-10-CM

## 2025-05-05 DIAGNOSIS — E05.90 HYPERTHYROIDISM: ICD-10-CM

## 2025-05-05 PROCEDURE — 99214 OFFICE O/P EST MOD 30 MIN: CPT | Performed by: FAMILY MEDICINE

## 2025-05-05 RX ORDER — LEVOTHYROXINE SODIUM 112 UG/1
112 TABLET ORAL DAILY
COMMUNITY
Start: 2025-03-27

## 2025-05-05 NOTE — PROGRESS NOTES
Chief Complaint  Follow-up    Subjective        Fátima Cortes presents to [facility]  History of Present Illness  The patient presents for evaluation of cough, anxiety, migraine, and hyperthyroidism.    She reports a persistent cough accompanied by yellowish mucus in her throat upon awakening, which has been ongoing for the past week. There is no ear pain, sore throat, or fever. She does not take any allergy medications such as Zyrtec or Allegra.    She is currently on buspirone for anxiety management and reports no issues with this medication.    She is also taking medication for migraines, which she finds beneficial as it has significantly reduced the frequency of her migraines.    Her thyroid labs were done recently. She has an appointment with her specialist on 06/21/2025. She has not had her neck ultrasound redone. When she was at the specialist, she was told that it is probably nothing to worry about right now.        Medical History: has a past medical history of Back pain, ETD (Eustachian tube dysfunction), bilateral, Lymphadenopathy of head and neck, Multiple thyroid nodules (03/13/2017), Recurrent tonsillitis, and Viral pharyngitis.   Surgical History: has a past surgical history that includes Ear Tubes Removal (2002) and Thyroid surgery (2017).   Family History: family history includes Lung cancer in an other family member; Thyroid cancer in an other family member.   Social History: reports that she has never smoked. She has never been exposed to tobacco smoke. She has never used smokeless tobacco. She reports that she does not drink alcohol and does not use drugs.  Immunization History   Administered Date(s) Administered    Flu Vaccine Intradermal Quad 18-64YR 02/04/2015    HPV Quadrivalent 02/04/2015, 03/17/2015    Hep A, 2 Dose 08/27/2014, 03/17/2015    Influenza Seasonal Injectable 02/04/2015    MCV4 Unspecified 08/27/2014    Meningococcal B,(Bexsero) 11/04/2019     "Meningococcal MCV4P (Menactra) 08/27/2014    Tdap 08/27/2014    Varicella 08/27/2014       Objective   Vital Signs:  /76   Pulse 71   Temp 98.3 °F (36.8 °C) (Oral)   Ht 157.5 cm (62\")   Wt 63.7 kg (140 lb 6.4 oz)   SpO2 99%   BMI 25.68 kg/m²   Estimated body mass index is 25.68 kg/m² as calculated from the following:    Height as of this encounter: 157.5 cm (62\").    Weight as of this encounter: 63.7 kg (140 lb 6.4 oz).     BMI is >= 25 and <30. (Overweight) The following options were offered after discussion;: nutrition counseling/recommendations      ROS:  Review of Systems   Constitutional:  Negative for fatigue and fever.   HENT:  Negative for congestion, ear pain and sinus pressure.    Respiratory:  Negative for cough, chest tightness and shortness of breath.    Cardiovascular:  Negative for chest pain, palpitations and leg swelling.   Gastrointestinal:  Negative for abdominal pain and diarrhea.   Genitourinary:  Negative for dysuria and frequency.   Neurological:  Negative for speech difficulty, headache and confusion.   Psychiatric/Behavioral:  Negative for agitation and behavioral problems.       Physical Exam  Vitals reviewed.   Constitutional:       Appearance: Normal appearance.   HENT:      Right Ear: Tympanic membrane normal.      Left Ear: Tympanic membrane normal.      Nose: Nose normal.   Eyes:      Extraocular Movements: Extraocular movements intact.      Conjunctiva/sclera: Conjunctivae normal.      Pupils: Pupils are equal, round, and reactive to light.   Cardiovascular:      Rate and Rhythm: Normal rate and regular rhythm.   Pulmonary:      Effort: Pulmonary effort is normal.      Breath sounds: Normal breath sounds.   Abdominal:      General: Bowel sounds are normal.   Musculoskeletal:         General: Normal range of motion.      Cervical back: Normal range of motion.   Skin:     General: Skin is warm and dry.   Neurological:      General: No focal deficit present.      Mental " Status: She is alert and oriented to person, place, and time.   Psychiatric:         Mood and Affect: Mood normal.         Behavior: Behavior normal.       Physical Exam  Ears: External ear canals and tympanic membranes intact  Mouth/Throat: Mucous membranes moist, no erythema, no exudate  Respiratory: Clear to auscultation, no wheezing, rales or rhonchi  Cardiovascular: Regular rate and rhythm, no murmurs, rubs, or gallops      Result Review     The following data was reviewed by: Daily Marley MD on 05/05/2025:  Common labs          8/29/2024    15:49 2/4/2025    15:36   Common Labs   Glucose 69  82    BUN 6  8    Creatinine 0.74  0.66    Sodium 136  139    Potassium 3.4  3.8    Chloride 98  106    Calcium 9.8  9.7    Albumin 4.1  4.4    Total Bilirubin 0.3  0.6    Alkaline Phosphatase 78  86    AST (SGOT) 60  26    ALT (SGPT) 54  17    WBC 7.79  11.49    Hemoglobin 14.0  12.6    Hematocrit 42.6  37.6    Platelets 386  357      Results  Labs   - TSH: Indicates hyperthyroidism   - Kidney function test: Normal             Assessment and Plan   Diagnoses and all orders for this visit:    1. Anxiety (Primary)    2. Migraine without status migrainosus, not intractable, unspecified migraine type    3. Seasonal allergic rhinitis due to pollen    4. Hyperthyroidism      Assessment & Plan  1. Allergies.  - Symptoms suggest an allergic reaction, likely due to environmental factors.  - Physical examination reveals no ear pain, sore throat, or fever; drainage is noted.  - Counseling provided on allergy management; samples of Zyrtec given for trial.  - If Zyrtec does not alleviate symptoms, patient should inform the clinic.    2. Anxiety.  - Currently taking buspirone for anxiety.  - Reports medication is effective.  - No changes to current regimen necessary.  - Continue monitoring for any changes in symptoms.    3. Migraine.  - On medication for migraines.  - Reports significant improvement and no recent migraines.  - No  changes to current regimen necessary.  - Continue monitoring for any recurrence of symptoms.    4. Hyperthyroidism.  - Recent thyroid labs indicate hyperthyroidism with low TSH levels.  - Follow-up appointment with specialist scheduled for 06/21/2025.  - No immediate changes to treatment plan necessary until further evaluation by specialist.  - Ultrasound of neck not redone; specialist advised it is not currently a concern.    Follow-up  - Patient will follow up in 6 months after seeing the specialist.       I spent 35 minutes caring for Fátima on this date of service. This time includes time spent by me in the following activities:reviewing tests  Follow Up  Return in about 6 months (around 11/5/2025).  Patient was given instructions and counseling regarding her condition or for health maintenance advice. Please see specific information pulled into the AVS if appropriate.   Patient or patient representative verbalized consent for the use of Ambient Listening during the visit with  Daily Marley MD for chart documentation. 5/12/2025  12:54 EDT    Daily Marley MD

## 2025-05-11 DIAGNOSIS — F41.9 ANXIETY: ICD-10-CM

## 2025-05-11 DIAGNOSIS — F51.5 NIGHTMARES: ICD-10-CM

## 2025-05-12 RX ORDER — BUSPIRONE HYDROCHLORIDE 5 MG/1
5 TABLET ORAL 3 TIMES DAILY
Qty: 90 TABLET | Refills: 0 | Status: SHIPPED | OUTPATIENT
Start: 2025-05-12

## 2025-05-12 RX ORDER — PRAZOSIN HYDROCHLORIDE 1 MG/1
1 CAPSULE ORAL NIGHTLY
Qty: 30 CAPSULE | Refills: 2 | Status: SHIPPED | OUTPATIENT
Start: 2025-05-12

## 2025-05-16 DIAGNOSIS — F41.9 ANXIETY: ICD-10-CM

## 2025-05-16 RX ORDER — BUSPIRONE HYDROCHLORIDE 5 MG/1
5 TABLET ORAL 3 TIMES DAILY
Qty: 90 TABLET | Refills: 0 | OUTPATIENT
Start: 2025-05-16

## 2025-07-28 ENCOUNTER — OFFICE VISIT (OUTPATIENT)
Dept: OBSTETRICS AND GYNECOLOGY | Age: 25
End: 2025-07-28
Payer: COMMERCIAL

## 2025-07-28 VITALS
DIASTOLIC BLOOD PRESSURE: 68 MMHG | BODY MASS INDEX: 25.03 KG/M2 | SYSTOLIC BLOOD PRESSURE: 120 MMHG | HEIGHT: 62 IN | HEART RATE: 70 BPM | WEIGHT: 136 LBS

## 2025-07-28 DIAGNOSIS — Z01.419 ENCOUNTER FOR GYNECOLOGICAL EXAMINATION WITHOUT ABNORMAL FINDING: Primary | ICD-10-CM

## 2025-07-28 PROCEDURE — 99395 PREV VISIT EST AGE 18-39: CPT | Performed by: OBSTETRICS & GYNECOLOGY

## 2025-07-28 NOTE — PROGRESS NOTES
"Well Woman Visit    CC: Annual well woman exam       HPI:   25 y.o. Contraception or HRT: Contraception:  Birth control pill  Menses:   q mon, planning on stopping bcp soon  Pain:  None  Incontinence concerns: No  Hx of abnormal pap:  No  Pt has no complaints today.      History: PMHx, Meds, Allergies, PSHx, Social Hx, and POBHx all reviewed and updated.      PHYSICAL EXAM:  /68   Pulse 70   Ht 157.5 cm (62\")   Wt 61.7 kg (136 lb)   LMP 2025   BMI 24.87 kg/m²   General- NAD, alert and oriented, appropriate  Psych- Normal mood, good memory  Neck- No masses, no thyroid enlargement  CV- Regular rhythm, no murnurs  Resp- CTA to bases, no wheezes  Abdomen- Soft, non distended, non tender, no masses    Breast left-  Bilaterally symmetrical, no masses, non tender, no nipple discharge  Breast right- Bilaterally symmetrical, no masses, non tender, no nipple discharge    External genitalia- Normal female, no lesions  Urethra/meatus- Normal, no masses, non tender, no prolapse  Bladder- Normal, no masses, non tender, no prolapse  Vagina- Normal, no atrophy, no lesions, no discharge, no prolapse  Cvx- Normal, no lesions, no discharge, No cervical motion tenderness  Uterus- Normal size, shape & consistency.  Non tender, mobile.  Adnexa- No mass, non tender  Anus/Rectum/Perineum- Not performed    Lymphatic- No palpable neck, axillary, or groin nodes  Ext- No edema, no cyanosis    Skin- No lesions, no rashes, no acanthosis nigricans        ASSESSMENT and PLAN:  WWE    Diagnoses and all orders for this visit:    1. Encounter for gynecological examination without abnormal finding (Primary)    Pap due . Declines bc refills.     Counseling:     Track menses, RTO IF <q21d, >7d long, or heavy  PNV    Domestic violence/abuse screen: negative    Depression screen: no SI    Preventative:   BREAST HEALTH- Monthly self breast exam importance and how to reviewed. MMG and/or MRI (prn) reviewed per society guidelines " and her individual history. Mammo/MRI screen: Not medically needed.  CERVICAL CANCER Screening- Reviewed current ASCCP guidelines for screening w and wo cotest HPV, age specific.  Screen: Already up to date.  COLON CANCER Screening- Reviewed current medical society guidelines and options.  Colonoscopy screen:  Not medically needed.  SEXUAL HEALTH: Declines STD screening.  VACCINATIONS Recommended: Flu vaccine annually, Gardisil/HPV vaccine 9-27yo, up to 44yo.  Importance discussed, risk being unvaccinated reviewed.  Questions answered  Smoking status- NON SMOKER.  Importance of avoiding second hand smoke.  Myriad : does not qualify.  Gardasil status: completed.      She understands the importance of having any ordered tests to be performed in a timely fashion.  She is encouraged to review her results online and/or contact or office if she has questions.     Follow Up:  Return in about 1 year (around 7/28/2026) for Annual physical.      Lizette Parham, LEILA  07/28/2025

## 2025-08-20 DIAGNOSIS — F51.5 NIGHTMARES: ICD-10-CM

## 2025-08-20 RX ORDER — PRAZOSIN HYDROCHLORIDE 1 MG/1
1 CAPSULE ORAL NIGHTLY
Qty: 90 CAPSULE | Refills: 0 | Status: SHIPPED | OUTPATIENT
Start: 2025-08-20

## 2025-08-26 DIAGNOSIS — F51.5 NIGHTMARES: ICD-10-CM

## 2025-08-26 RX ORDER — PRAZOSIN HYDROCHLORIDE 1 MG/1
1 CAPSULE ORAL NIGHTLY
Qty: 90 CAPSULE | Refills: 0 | OUTPATIENT
Start: 2025-08-26

## 2025-08-27 DIAGNOSIS — F51.5 NIGHTMARES: ICD-10-CM

## 2025-08-28 RX ORDER — PRAZOSIN HYDROCHLORIDE 1 MG/1
1 CAPSULE ORAL NIGHTLY
Qty: 90 CAPSULE | Refills: 0 | OUTPATIENT
Start: 2025-08-28